# Patient Record
Sex: MALE | Race: WHITE | Employment: OTHER | URBAN - METROPOLITAN AREA
[De-identification: names, ages, dates, MRNs, and addresses within clinical notes are randomized per-mention and may not be internally consistent; named-entity substitution may affect disease eponyms.]

---

## 2018-03-15 ENCOUNTER — TRANSFERRED RECORDS (OUTPATIENT)
Dept: HEALTH INFORMATION MANAGEMENT | Facility: CLINIC | Age: 58
End: 2018-03-15

## 2018-03-19 ENCOUNTER — TRANSFERRED RECORDS (OUTPATIENT)
Dept: HEALTH INFORMATION MANAGEMENT | Facility: CLINIC | Age: 58
End: 2018-03-19

## 2018-04-23 ENCOUNTER — PRE VISIT (OUTPATIENT)
Dept: CARDIOLOGY | Facility: CLINIC | Age: 58
End: 2018-04-23

## 2018-04-24 ENCOUNTER — OFFICE VISIT (OUTPATIENT)
Dept: CARDIOLOGY | Facility: CLINIC | Age: 58
End: 2018-04-24
Payer: COMMERCIAL

## 2018-04-24 VITALS
SYSTOLIC BLOOD PRESSURE: 124 MMHG | WEIGHT: 173.3 LBS | HEART RATE: 60 BPM | DIASTOLIC BLOOD PRESSURE: 70 MMHG | BODY MASS INDEX: 22.97 KG/M2 | HEIGHT: 73 IN

## 2018-04-24 DIAGNOSIS — I35.9 AORTIC VALVULAR DISEASE: ICD-10-CM

## 2018-04-24 DIAGNOSIS — Z86.73 HISTORY OF TIA (TRANSIENT ISCHEMIC ATTACK) AND STROKE: Primary | ICD-10-CM

## 2018-04-24 DIAGNOSIS — I34.0 MYXOMATOUS MITRAL VALVE REGURGITATION: ICD-10-CM

## 2018-04-24 PROCEDURE — 93000 ELECTROCARDIOGRAM COMPLETE: CPT | Performed by: INTERNAL MEDICINE

## 2018-04-24 PROCEDURE — 99205 OFFICE O/P NEW HI 60 MIN: CPT | Performed by: INTERNAL MEDICINE

## 2018-04-24 RX ORDER — CLOPIDOGREL BISULFATE 75 MG/1
75 TABLET ORAL DAILY
COMMUNITY

## 2018-04-24 RX ORDER — ATORVASTATIN CALCIUM 40 MG/1
40 TABLET, FILM COATED ORAL DAILY
COMMUNITY

## 2018-04-24 NOTE — LETTER
4/24/2018      Kwaku Castro MD  [facility] [address]  [place] [address]      RE: Dean Duane Heuer       Dear Colleague,    I had the pleasure of seeing Tony Duane Heuer in the [facility].    Service Date: 04/24/2018      HISTORY OF PRESENT ILLNESS:  The patient is a pleasant 58-year-old gentleman who was seen by Cardiology, [facility] at the [facility] in [place], on 03/15/2018 by Dr. Aspen Neal.  At that time, his assessment was that he had an aneurysmal PFO with a previous history of cryptogenic stroke 6 years previously when he was 52 years of age.  Apparently he was flying to [facility] and while he was landing had severe headache which subsequently led to left-sided weakness, confusion and aphasia.  An MRI there showed a right occipital infarct and 2 small cerebellar infarcts, mostly suggestive of embolic CVA.  The MRI showed no disease of his head and neck vessels.  The etiology was felt secondary to his PFO.  A PK was performed at [facility] showing an aneurysmal atrial septum with moderate shunting with Valsalva.  Back in 2012, they did not suggest closure, but put him on Plavix and Lipitor.  He has done well over the past 6 years.  However, his primary physician felt there may be new evidence and was referred to Cardiology.  Again, after seeing Dr. Neal who recommended closure of his PFO, he sent him to Dr. Robert Loja.  Unfortunately, Dr. Loja was out of his network of care, and therefore, Dr. Loja referred him to me for consideration of closure.      Currently, Mr. Wilson is asymptomatic.  He has had no recurrent TIAs, chest pain, chest pressure, shortness of breath, heart racing, palpitations or edema.  He does not have obstructive sleep apnea.  However, review of his echocardiogram shows a fair amount more than just the PFO where he has moderate left atrial enlargement and myxomatous mitral valve with  moderate mitral regurgitation and 1-2+ aortic insufficiency with aortic sinus dilated at 4.1 cm.  He currently works as a  and he again as stated is on Plavix and Lipitor.      ASSESSMENT AND PLAN:  Overall, the patient is a 58-year-old gentleman with a previous history of cryptogenic stroke in  with a history of aneurysmal PFO and multiple embolic strokes.  I would agree with closure of the defect if there is no evidence of atrial fib which he is at increased risk of because of mitral valve disease and atrial enlargement.  I would recommend that we order a ZIO Patch for 10-14 days to look for asymptomatic atrial fibrillation as well as have him undergo transesophageal echocardiogram to reassess aortic mitral disease as well as assess risk for closure of the presumed PFO, sizing of aortic rim, inferior rim, etc.  This has been explained to Mr. Wilson who wants to proceed.  Risks and benefits of the transesophageal echocardiogram were explained to the patient.  We will have him follow back up with us after the above-mentioned tests are completed.         YOKO WARD MD [facility]             D: 2018   T: 2018   MT: RUDI      Name:     FRAN WILSONN   MRN:      [id number]        Account:      [id number]   :      1960           Service Date: 2018      Document: [id number]         Outpatient Encounter Prescriptions as of 2018   Medication Sig Dispense Refill     atorvastatin (LIPITOR) 40 MG tablet Take 40 mg by mouth daily       clopidogrel (PLAVIX) 75 MG tablet Take 75 mg by mouth daily       No facility-administered encounter medications on file as of 2018.        Again, thank you for allowing me to participate in the care of your patient.      Sincerely,    YOKO WARD MD     [facility]

## 2018-04-24 NOTE — PROGRESS NOTES
"HPI and Plan:   See dictation    Orders Placed This Encounter   Procedures     EKG 12-lead complete w/read - Clinics     Zio Patch Monitor     Transesophageal Echocardiogram     Orders Placed This Encounter   Medications     clopidogrel (PLAVIX) 75 MG tablet     Sig: Take 75 mg by mouth daily     atorvastatin (LIPITOR) 40 MG tablet     Sig: Take 40 mg by mouth daily     There are no discontinued medications.      Encounter Diagnoses   Name Primary?     History of TIA (transient ischemic attack) and stroke Yes     Aortic valvular disease      Myxomatous mitral valve regurgitation        CURRENT MEDICATIONS:  Current Outpatient Prescriptions   Medication Sig Dispense Refill     atorvastatin (LIPITOR) 40 MG tablet Take 40 mg by mouth daily       clopidogrel (PLAVIX) 75 MG tablet Take 75 mg by mouth daily         ALLERGIES   Allergies not on file    PAST MEDICAL HISTORY:  Past Medical History:   Diagnosis Date     PFO (patent foramen ovale)        PAST SURGICAL HISTORY:  No past surgical history on file.    FAMILY HISTORY:  No family history on file.    SOCIAL HISTORY:  Social History     Social History     Marital status:      Spouse name: N/A     Number of children: N/A     Years of education: N/A     Social History Main Topics     Smoking status: Never Smoker     Smokeless tobacco: Never Used     Alcohol use Yes     Drug use: None     Sexual activity: Not Asked     Other Topics Concern     None     Social History Narrative       Review of Systems:  Skin:  Negative     Eyes:  Positive for glasses  ENT:  Negative    Respiratory:  Negative    Cardiovascular:  Negative    Gastroenterology: Negative    Genitourinary:  Negative    Musculoskeletal:  Negative    Neurologic:  Negative    Psychiatric:  Negative    Heme/Lymph/Imm:  Negative    Endocrine:  Negative      Physical Exam:  Vitals: /70  Pulse 60  Ht 1.854 m (6' 1\")  Wt 78.6 kg (173 lb 4.8 oz)  BMI 22.86 kg/m2    Constitutional:  cooperative, alert " and oriented, well developed, well nourished, in no acute distress        Skin:  warm and dry to the touch, no apparent skin lesions or masses noted          Head:  normocephalic, no masses or lesions        Eyes:  pupils equal and round, conjunctivae and lids unremarkable, sclera white, no xanthalasma, EOMS intact, no nystagmus        Lymph:No Cervical lymphadenopathy present     ENT:           Neck:  carotid pulses are full and equal bilaterally, JVP normal, no carotid bruit        Respiratory:  normal breath sounds, clear to auscultation, normal A-P diameter, normal symmetry, normal respiratory excursion, no use of accessory muscles         Cardiac: regular rhythm       late systolic murmur;grade 3        pulses full and equal, no bruits auscultated                                        GI:  abdomen soft, non-tender, BS normoactive, no mass, no HSM, no bruits        Extremities and Muscular Skeletal:  no deformities, clubbing, cyanosis, erythema observed              Neurological:  no gross motor deficits        Psych:  Alert and Oriented x 3        Recent Lab Results:  LIPID RESULTS:  No results found for: CHOL, HDL, LDL, TRIG, CHOLHDLRATIO    LIVER ENZYME RESULTS:  No results found for: AST, ALT    CBC RESULTS:  No results found for: WBC, RBC, HGB, HCT, MCV, MCH, MCHC, RDW, PLT    BMP RESULTS:  No results found for: NA, POTASSIUM, CHLORIDE, CO2, ANIONGAP, GLC, BUN, CR, GFRESTIMATED, GFRESTBLACK, JOSE     A1C RESULTS:  No results found for: A1C    INR RESULTS:  No results found for: INR        CC  Kwaku Castro MD  [address]  [place] [address]

## 2018-04-24 NOTE — LETTER
4/24/2018    Kwaku Castro MD  [facility] [address]  [place] [address]    RE: Dean Duane Heuer       Dear Colleague,    I had the pleasure of seeing Dean Duane Heuer in the [facility].    HPI and Plan:   See dictation    Orders Placed This Encounter   Procedures     EKG 12-lead complete w/read - Clinics     Zio Patch Monitor     Transesophageal Echocardiogram     Orders Placed This Encounter   Medications     clopidogrel (PLAVIX) 75 MG tablet     Sig: Take 75 mg by mouth daily     atorvastatin (LIPITOR) 40 MG tablet     Sig: Take 40 mg by mouth daily     There are no discontinued medications.      Encounter Diagnoses   Name Primary?     History of TIA (transient ischemic attack) and stroke Yes     Aortic valvular disease      Myxomatous mitral valve regurgitation        CURRENT MEDICATIONS:  Current Outpatient Prescriptions   Medication Sig Dispense Refill     atorvastatin (LIPITOR) 40 MG tablet Take 40 mg by mouth daily       clopidogrel (PLAVIX) 75 MG tablet Take 75 mg by mouth daily         ALLERGIES   Allergies not on file    PAST MEDICAL HISTORY:  Past Medical History:   Diagnosis Date     PFO (patent foramen ovale)        PAST SURGICAL HISTORY:  No past surgical history on file.    FAMILY HISTORY:  No family history on file.    SOCIAL HISTORY:  Social History     Social History     Marital status:      Spouse name: N/A     Number of children: N/A     Years of education: N/A     Social History Main Topics     Smoking status: Never Smoker     Smokeless tobacco: Never Used     Alcohol use Yes     Drug use: None     Sexual activity: Not Asked     Other Topics Concern     None     Social History Narrative       Review of Systems:  Skin:  Negative     Eyes:  Positive for glasses  ENT:  Negative    Respiratory:  Negative    Cardiovascular:  Negative    Gastroenterology: Negative    Genitourinary:  Negative    Musculoskeletal:  Negative    Neurologic:  Negative   "  Psychiatric:  Negative    Heme/Lymph/Imm:  Negative    Endocrine:  Negative      Physical Exam:  Vitals: /70  Pulse 60  Ht 1.854 m (6' 1\")  Wt 78.6 kg (173 lb 4.8 oz)  BMI 22.86 kg/m2    Constitutional:  cooperative, alert and oriented, well developed, well nourished, in no acute distress        Skin:  warm and dry to the touch, no apparent skin lesions or masses noted          Head:  normocephalic, no masses or lesions        Eyes:  pupils equal and round, conjunctivae and lids unremarkable, sclera white, no xanthalasma, EOMS intact, no nystagmus        Lymph:No Cervical lymphadenopathy present     ENT:           Neck:  carotid pulses are full and equal bilaterally, JVP normal, no carotid bruit        Respiratory:  normal breath sounds, clear to auscultation, normal A-P diameter, normal symmetry, normal respiratory excursion, no use of accessory muscles         Cardiac: regular rhythm       late systolic murmur;grade 3        pulses full and equal, no bruits auscultated                                        GI:  abdomen soft, non-tender, BS normoactive, no mass, no HSM, no bruits        Extremities and Muscular Skeletal:  no deformities, clubbing, cyanosis, erythema observed              Neurological:  no gross motor deficits        Psych:  Alert and Oriented x 3        Recent Lab Results:  LIPID RESULTS:  No results found for: CHOL, HDL, LDL, TRIG, CHOLHDLRATIO    LIVER ENZYME RESULTS:  No results found for: AST, ALT    CBC RESULTS:  No results found for: WBC, RBC, HGB, HCT, MCV, MCH, MCHC, RDW, PLT    BMP RESULTS:  No results found for: NA, POTASSIUM, CHLORIDE, CO2, ANIONGAP, GLC, BUN, CR, GFRESTIMATED, GFRESTBLACK, JOSE     A1C RESULTS:  No results found for: A1C    INR RESULTS:  No results found for: INR        CC  Kwaku Castro MD  [address] [place] [address]                  Thank you for allowing me to participate in the care of your patient.      Sincerely, "     YOKO WARD MD     [facility]    cc:   Kwaku Castro MD  [address] [place] [address]

## 2018-04-24 NOTE — MR AVS SNAPSHOT
"              After Visit Summary   4/24/2018    Dean Duane Heuer    MRN: [id number]           Patient Information     Date Of Birth          1960        Visit Information        Provider Department      4/24/2018 12:45 PM Mychal Hargrove MD [facility]        Today's Diagnoses     History of TIA (transient ischemic attack) and stroke    -  1    Aortic valvular disease        Myxomatous mitral valve regurgitation           Follow-ups after your visit        Future tests that were ordered for you today     Open Future Orders        Priority Expected Expires Ordered    Transesophageal Echocardiogram Routine 5/1/2018 4/24/2019 4/24/2018    Zio Patch Monitor Routine 5/1/2018 4/24/2019 4/24/2018            Who to contact     If you have questions or need follow up information about today's clinic visit or your schedule please contact [facility] directly at [phone].  Normal or non-critical lab and imaging results will be communicated to you by MyChart, letter or phone within 4 business days after the clinic has received the results. If you do not hear from us within 7 days, please contact the clinic through [employer] or phone. If you have a critical or abnormal lab result, we will notify you by phone as soon as possible.  Submit refill requests through [employer] or call your pharmacy and they will forward the refill request to us. Please allow 3 business days for your refill to be completed.          Additional Information About Your Visit        MyChart Information     [employer] lets you send messages to your doctor, view your test results, renew your prescriptions, schedule appointments and more. To sign up, go to [email or web address] . Click on \"Log in\" on the left side of the screen, which will take you to the Welcome page. Then click on \"Sign up Now\" on the right side of the page.     You will be asked to enter the access code " "listed below, as well as some personal information. Please follow the directions to create your username and password.     Your access code is: [email or web address]  Expires: 2018  1:26 PM     Your access code will  in 90 days. If you need help or a new code, please call your [facility] or [phone].        Care EveryWhere ID     This is your Care EveryWhere ID. This could be used by other organizations to access your [facility] medical records  [id number]        Your Vitals Were     Pulse Height BMI (Body Mass Index)             60 1.854 m (6' 1\") 22.86 kg/m2          Blood Pressure from Last 3 Encounters:   18 124/70    Weight from Last 3 Encounters:   18 78.6 kg (173 lb 4.8 oz)              We Performed the Following     EKG 12-lead complete w/read - Clinics        Primary Care Provider Office Phone # Fax #    Kwaku Castro [phone]       [address] [address]        Equal Access to Services     [facility]: Hadii aad ku hadasho Soomaali, waaxda luqadaha, qaybta kaalmada adeegyada, jovi vargas . So [facility] [phone].    ATENCIÓN: Si habla español, tiene a damon disposición servicios gratuitos de asistencia lingüística. Ken al [phone].    We comply with applicable federal civil rights laws and [place] laws. We do not discriminate on the basis of race, color, national origin, age, disability, sex, sexual orientation, or gender identity.            Thank you!     Thank you for choosing [facility]  for your care. Our goal is always to provide you with excellent care. Hearing back from our patients is one way we can continue to improve our services. Please take a few minutes to complete the written survey that you may receive in the mail after your visit with us. Thank you!             Your Updated Medication List - Protect others around you: Learn how to safely use, " store and throw away your medicines at www.disposemymeds.org.          This list is accurate as of 4/24/18  1:26 PM.  Always use your most recent med list.                   Brand Name Dispense Instructions for use Diagnosis    LIPITOR 40 MG tablet   Generic drug:  atorvastatin      Take 40 mg by mouth daily        PLAVIX 75 MG tablet   Generic drug:  clopidogrel      Take 75 mg by mouth daily

## 2018-04-24 NOTE — PROGRESS NOTES
Service Date: 04/24/2018      HISTORY OF PRESENT ILLNESS:  The patient is a pleasant 58-year-old gentleman who was seen by Cardiology, [facility] at the [facility] in [place], on 03/15/2018 by Dr. Aspen Neal.  At that time, his assessment was that he had an aneurysmal PFO with a previous history of cryptogenic stroke 6 years previously when he was 52 years of age.  Apparently he was flying to [facility] and while he was landing had severe headache which subsequently led to left-sided weakness, confusion and aphasia.  An MRI there showed a right occipital infarct and 2 small cerebellar infarcts, mostly suggestive of embolic CVA.  The MRI showed no disease of his head and neck vessels.  The etiology was felt secondary to his PFO.  A PK was performed at [facility] showing an aneurysmal atrial septum with moderate shunting with Valsalva.  Back in 2012, they did not suggest closure, but put him on Plavix and Lipitor.  He has done well over the past 6 years.  However, his primary physician felt there may be new evidence and was referred to Cardiology.  Again, after seeing Dr. Neal who recommended closure of his PFO, he sent him to Dr. Robert Loja.  Unfortunately, Dr. Loaj was out of his network of care, and therefore, Dr. Loja referred him to me for consideration of closure.      Currently, Mr. Wilson is asymptomatic.  He has had no recurrent TIAs, chest pain, chest pressure, shortness of breath, heart racing, palpitations or edema.  He does not have obstructive sleep apnea.  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  He currently works as a  and he again as stated is on Plavix and Lipitor.      ASSESSMENT AND PLAN:  Overall, the patient is a  58-year-old gentleman with a previous history of cryptogenic stroke in 2012 with a history of aneurysmal PFO and multiple embolic strokes.  I would agree with closure of the defect if there is no evidence of atrial fib which he is at increased risk of because of mitral valve disease and atrial enlargement.  I would recommend that we order a ZIO Patch for 10-14 days to look for asymptomatic atrial fibrillation as well as have him undergo transesophageal echocardiogram to reassess aortic mitral disease as well as assess risk for closure of the presumed PFO, sizing of aortic rim, inferior rim, etc.  This has been explained to Mr. Wilson who wants to proceed.  Risks and benefits of the transesophageal echocardiogram were explained to the patient.  We will have him follow back up with us after the above-mentioned tests are completed.         YOKO WARD MD [facility]             D: 2018   T: 2018   MT: RUDI      Name:     KAL WILSON   MRN:      [id number]        Account:      [id number]   :      1960           Service Date: 2018      Document: [id number]

## 2018-05-09 ENCOUNTER — HOSPITAL ENCOUNTER (OUTPATIENT)
Dept: CARDIOLOGY | Facility: CLINIC | Age: 58
End: 2018-05-09
Attending: INTERNAL MEDICINE | Admitting: INTERNAL MEDICINE
Payer: COMMERCIAL

## 2018-05-09 ENCOUNTER — HOSPITAL ENCOUNTER (OUTPATIENT)
Facility: CLINIC | Age: 58
Discharge: HOME | End: 2018-05-09
Attending: INTERNAL MEDICINE | Admitting: INTERNAL MEDICINE
Payer: COMMERCIAL

## 2018-05-09 VITALS
BODY MASS INDEX: 23.03 KG/M2 | RESPIRATION RATE: 16 BRPM | WEIGHT: 170 LBS | TEMPERATURE: 97.6 F | SYSTOLIC BLOOD PRESSURE: 121 MMHG | HEIGHT: 72 IN | DIASTOLIC BLOOD PRESSURE: 74 MMHG | HEART RATE: 58 BPM | OXYGEN SATURATION: 97 %

## 2018-05-09 DIAGNOSIS — I34.0 MYXOMATOUS MITRAL VALVE REGURGITATION: ICD-10-CM

## 2018-05-09 DIAGNOSIS — I35.9 AORTIC VALVULAR DISEASE: ICD-10-CM

## 2018-05-09 DIAGNOSIS — Z86.73 HISTORY OF TIA (TRANSIENT ISCHEMIC ATTACK) AND STROKE: ICD-10-CM

## 2018-05-09 PROCEDURE — 93312 ECHO TRANSESOPHAGEAL: CPT | Mod: 26 | Performed by: INTERNAL MEDICINE

## 2018-05-09 PROCEDURE — 0296T ZIO PATCH HOLTER: CPT

## 2018-05-09 PROCEDURE — 25000125 ZZHC RX 250

## 2018-05-09 PROCEDURE — 40000235 ZZH STATISTIC TELEMETRY

## 2018-05-09 PROCEDURE — 93320 DOPPLER ECHO COMPLETE: CPT | Mod: 26 | Performed by: INTERNAL MEDICINE

## 2018-05-09 PROCEDURE — 25000125 ZZHC RX 250: Performed by: INTERNAL MEDICINE

## 2018-05-09 PROCEDURE — 0298T ZZC EXT ECG > 48HR TO 21 DAY REVIEW AND INTERPRETATN: CPT | Performed by: INTERNAL MEDICINE

## 2018-05-09 PROCEDURE — 93325 DOPPLER ECHO COLOR FLOW MAPG: CPT | Mod: 26 | Performed by: INTERNAL MEDICINE

## 2018-05-09 PROCEDURE — 93312 ECHO TRANSESOPHAGEAL: CPT

## 2018-05-09 PROCEDURE — 25000128 H RX IP 250 OP 636: Performed by: INTERNAL MEDICINE

## 2018-05-09 PROCEDURE — 40000857 ZZH STATISTIC TEE INCLUDES SEDATION

## 2018-05-09 RX ORDER — GLYCOPYRROLATE 0.2 MG/ML
0.1 INJECTION, SOLUTION INTRAMUSCULAR; INTRAVENOUS ONCE
Status: DISCONTINUED | OUTPATIENT
Start: 2018-05-09 | End: 2018-05-10 | Stop reason: HOSPADM

## 2018-05-09 RX ORDER — LIDOCAINE 50 MG/G
0.5 OINTMENT TOPICAL ONCE
Status: COMPLETED | OUTPATIENT
Start: 2018-05-09 | End: 2018-05-09

## 2018-05-09 RX ORDER — LIDOCAINE 40 MG/G
CREAM TOPICAL
Status: DISCONTINUED | OUTPATIENT
Start: 2018-05-09 | End: 2018-05-09 | Stop reason: HOSPADM

## 2018-05-09 RX ORDER — FENTANYL CITRATE 50 UG/ML
25-50 INJECTION, SOLUTION INTRAMUSCULAR; INTRAVENOUS
Status: DISCONTINUED | OUTPATIENT
Start: 2018-05-09 | End: 2018-05-09 | Stop reason: HOSPADM

## 2018-05-09 RX ORDER — FENTANYL CITRATE 50 UG/ML
25-50 INJECTION, SOLUTION INTRAMUSCULAR; INTRAVENOUS
Status: DISCONTINUED | OUTPATIENT
Start: 2018-05-09 | End: 2018-05-10 | Stop reason: HOSPADM

## 2018-05-09 RX ORDER — GLYCOPYRROLATE 0.2 MG/ML
0.1 INJECTION, SOLUTION INTRAMUSCULAR; INTRAVENOUS ONCE
Status: COMPLETED | OUTPATIENT
Start: 2018-05-09 | End: 2018-05-09

## 2018-05-09 RX ORDER — NALOXONE HYDROCHLORIDE 0.4 MG/ML
.1-.4 INJECTION, SOLUTION INTRAMUSCULAR; INTRAVENOUS; SUBCUTANEOUS
Status: DISCONTINUED | OUTPATIENT
Start: 2018-05-09 | End: 2018-05-09 | Stop reason: HOSPADM

## 2018-05-09 RX ORDER — NALOXONE HYDROCHLORIDE 0.4 MG/ML
.1-.4 INJECTION, SOLUTION INTRAMUSCULAR; INTRAVENOUS; SUBCUTANEOUS
Status: DISCONTINUED | OUTPATIENT
Start: 2018-05-09 | End: 2018-05-10 | Stop reason: HOSPADM

## 2018-05-09 RX ORDER — FENTANYL CITRATE 50 UG/ML
50-100 INJECTION, SOLUTION INTRAMUSCULAR; INTRAVENOUS
Status: DISCONTINUED | OUTPATIENT
Start: 2018-05-09 | End: 2018-05-10 | Stop reason: HOSPADM

## 2018-05-09 RX ORDER — LIDOCAINE 40 MG/G
CREAM TOPICAL
Status: DISCONTINUED | OUTPATIENT
Start: 2018-05-09 | End: 2018-05-10 | Stop reason: HOSPADM

## 2018-05-09 RX ORDER — FLUMAZENIL 0.1 MG/ML
0.2 INJECTION, SOLUTION INTRAVENOUS
Status: DISCONTINUED | OUTPATIENT
Start: 2018-05-09 | End: 2018-05-09 | Stop reason: HOSPADM

## 2018-05-09 RX ORDER — FENTANYL CITRATE 50 UG/ML
50-100 INJECTION, SOLUTION INTRAMUSCULAR; INTRAVENOUS
Status: COMPLETED | OUTPATIENT
Start: 2018-05-09 | End: 2018-05-09

## 2018-05-09 RX ORDER — FLUMAZENIL 0.1 MG/ML
0.2 INJECTION, SOLUTION INTRAVENOUS
Status: DISCONTINUED | OUTPATIENT
Start: 2018-05-09 | End: 2018-05-10 | Stop reason: HOSPADM

## 2018-05-09 RX ORDER — SODIUM CHLORIDE 9 MG/ML
INJECTION, SOLUTION INTRAVENOUS CONTINUOUS PRN
Status: DISCONTINUED | OUTPATIENT
Start: 2018-05-09 | End: 2018-05-10 | Stop reason: HOSPADM

## 2018-05-09 RX ORDER — SODIUM CHLORIDE 9 MG/ML
INJECTION, SOLUTION INTRAVENOUS CONTINUOUS PRN
Status: DISCONTINUED | OUTPATIENT
Start: 2018-05-09 | End: 2018-05-09 | Stop reason: HOSPADM

## 2018-05-09 RX ADMIN — FENTANYL CITRATE 25 MCG: 50 INJECTION, SOLUTION INTRAMUSCULAR; INTRAVENOUS at 13:32

## 2018-05-09 RX ADMIN — GLYCOPYRROLATE 0.1 MG: 0.2 INJECTION, SOLUTION INTRAMUSCULAR; INTRAVENOUS at 12:43

## 2018-05-09 RX ADMIN — MIDAZOLAM HYDROCHLORIDE 1 MG: 1 INJECTION, SOLUTION INTRAMUSCULAR; INTRAVENOUS at 13:28

## 2018-05-09 RX ADMIN — LIDOCAINE 0.5 G: 50 OINTMENT TOPICAL at 12:50

## 2018-05-09 RX ADMIN — MIDAZOLAM HYDROCHLORIDE 1 MG: 1 INJECTION, SOLUTION INTRAMUSCULAR; INTRAVENOUS at 13:25

## 2018-05-09 RX ADMIN — SODIUM CHLORIDE: 9 INJECTION, SOLUTION INTRAVENOUS at 12:19

## 2018-05-09 RX ADMIN — FENTANYL CITRATE 50 MCG: 50 INJECTION, SOLUTION INTRAMUSCULAR; INTRAVENOUS at 13:29

## 2018-05-09 RX ADMIN — FENTANYL CITRATE 50 MCG: 50 INJECTION, SOLUTION INTRAMUSCULAR; INTRAVENOUS at 13:26

## 2018-05-09 NOTE — IP AVS SNAPSHOT
MRN:[id number]                      After Visit Summary   5/9/2018    Tony Duane Heuer    MRN: [id number]           Visit Information        Department      5/9/2018 11:36 AM [facility]          Review of your medicines      UNREVIEWED medicines. Ask your doctor about these medicines        Dose / Directions    LIPITOR 40 MG tablet   Generic drug:  atorvastatin        Dose:  40 mg   Take 40 mg by mouth daily   Refills:  0       PLAVIX 75 MG tablet   Generic drug:  clopidogrel        Dose:  75 mg   Take 75 mg by mouth daily   Refills:  0                Protect others around you: Learn how to safely use, store and throw away your medicines at www.disposemymeds.org.         Follow-ups after your visit        Your next 10 appointments already scheduled     May 09, 2018  1:30 PM CDT   Ech Uday with SHECHR2   [facility] ([facility])    [address] [place] [address]   [phone]           1.  Please bring or wear a comfortable two-piece outfit. 2.  Arrival time: -   [facility]:  arrive 75 minutes prior to examination time. -   [facility]:  arrive 90 minutes prior to examination time. -   [facility]:   arrive 15 minutes prior to examination time. 3.  Plan to have someone here to drive you home after the test. -   Someone should stay with you for 6 hours after your test. 4.  No food or drink: -   6 hours before the test 5.  If you take antacids or water pills (diuretics): Do not take them until after your test. You may take blood pressure medicine with a few sips of water. 6.  If you have diabetes: -   Morning slots preferred -   If you take insulin, call your diabetes care team. Ask if you should take a   dose the morning of your test. -   If you take diabetes medicine by mouth, don't take it on the morning of your test. Bring it with you to take after the test. (If you have questions, call your diabetes care team.) 7.  Bring a list of  any medicines you are taking. 8.  Do not drive for 24 hours after the test. 9.   A responsible adult must stay with you for 24 hours after the test.  10.  For any questions that cannot be answered, please contact the ordering physician            May 09, 2018  4:00 PM CDT   ZIOPATCH MONITOR with NENA   [facility] Radiology - [facility] ([facility])    [address]  [place] [address] [phone]               Care Instructions        Further instructions from your care team       PK  (Transesophageal Echocardiogram)  Discharge Instructions    After you go home:      Have an adult stay with you for 6 hours.       For 24 hours - due to the sedation you received:    Relax and take it easy.    Do NOT make any important or legal decisions.    Do NOT drive or operate machines at home or at work.    Do NOT drink alcohol.    Diet:    You may resume your normal diet, but no scratchy foods for two days.    If your throat is sore, eat cold, bland or soft foods.    You may have heartburn if the tube used in the exam entered your stomach.  If so:   - Do not eat acidic and spicy foods.   - Do not eat three hours before bedtime. Clear liquids are okay.   - When lying down, use two pillows to raise your head.    Medicines:      Take your medications, including blood thinners, unless your provider tells you not to.    If you have stopped any medicines, check with your provider about when to restart them.    You may take Tylenol (Acetaminophen) if your throat is sore.    You may take antacids if you have heartburn.      Follow Up Appointments:      Follow up with your cardiologist at [facility] of patient preference as instructed.    Follow up with your primary care provider as needed.    Call the clinic if:      You have heartburn that is severe or lasts more than 72 hours.    You have a sore throat that feels worse after 72 hours.    You have shortness of breath, neck pain,  "chest pain, fever, chills, coughing up blood, or other unusual signs.    Questions or concerns      [facility] at [place]:    [phone] [facility] (7 days a week)         Additional Information About Your Visit        MyChart Information     [facility] lets you send messages to your doctor, view your test results, renew your prescriptions, schedule appointments and more. To sign up, go to [email or web address] . Click on \"Log in\" on the left side of the screen, which will take you to the Welcome page. Then click on \"Sign up Now\" on the right side of the page.     You will be asked to enter the access code listed below, as well as some personal information. Please follow the directions to create your username and password.     Your access code is: [email or web address]  Expires: 2018  1:26 PM     Your access code will  in 90 days. If you need help or a new code, please call your [place] clinic or [phone].        Care EveryWhere ID     This is your Care EveryWhere ID. This could be used by other organizations to access your [place] medical records  [id number]        Your Vitals Were     Blood Pressure Pulse Temperature Respirations Height Weight    129/70 (BP Location: Left arm) 62 97.6  F (36.4  C) (Oral) 16 1.829 m (6') 77.1 kg (170 lb)    Pulse Oximetry BMI (Body Mass Index)                99% 23.06 kg/m2           Primary Care Provider Office Phone # Fax #    Kwaku Castro [phone]      Equal Access to Services     [facility] : Hadii eri david hadasho Soglynnali, waaxda luqadaha, qaybta kaalmada adeegyalaura, jovi man. So [facility] [phone].    ATENCIÓN: Si habla español, tiene a damon disposición servicios gratuitos de asistencia lingüística. Llame al [phone].    We comply with applicable federal civil rights laws and [place] laws. We do not discriminate on the basis of race, color, national origin, age, disability, sex, " sexual orientation, or gender identity.            Thank you!     Thank you for choosing [facility] for your care. Our goal is always to provide you with excellent care. Hearing back from our patients is one way we can continue to improve our services. Please take a few minutes to complete the written survey that you may receive in the mail after you visit with us. Thank you!             Medication List: This is a list of all your medications and when to take them. Check marks below indicate your daily home schedule. Keep this list as a reference.      Medications           Morning Afternoon Evening Bedtime As Needed    LIPITOR 40 MG tablet   Take 40 mg by mouth daily   Generic drug:  atorvastatin                                PLAVIX 75 MG tablet   Take 75 mg by mouth daily   Generic drug:  clopidogrel

## 2018-05-09 NOTE — IP AVS SNAPSHOT
[place] [address]    [place] [address]    Phone:  [phone]                                       After Visit Summary   5/9/2018    Tony Duane Heuer    MRN: [id number]           After Visit Summary Signature Page     I have received my discharge instructions, and my questions have been answered. I have discussed any challenges I see with this plan with the nurse or doctor.    ..........................................................................................................................................  Patient/Patient Representative Signature      ..........................................................................................................................................  Patient Representative Print Name and Relationship to Patient    ..................................................               ................................................  Date                                            Time    ..........................................................................................................................................  Reviewed by Signature/Title    ...................................................              ..............................................  Date                                                            Time

## 2018-05-09 NOTE — PROGRESS NOTES
PK completed per Dr. Hoff. Pt drowsy but awakens easily and denies any c/o and states tolerated PK well. AVS was reviewed and given to pt and wife pre PK. Will observe.

## 2018-05-09 NOTE — PROGRESS NOTES
Tolerating ice cream, crackers, juice well. Wife here with pt. Denies any c/o. Ready for discharge and will get Zio patch before leaving.

## 2018-05-09 NOTE — DISCHARGE INSTRUCTIONS
PK  (Transesophageal Echocardiogram)  Discharge Instructions    After you go home:      Have an adult stay with you for 6 hours.       For 24 hours - due to the sedation you received:    Relax and take it easy.    Do NOT make any important or legal decisions.    Do NOT drive or operate machines at home or at work.    Do NOT drink alcohol.    Diet:    You may resume your normal diet, but no scratchy foods for two days.    If your throat is sore, eat cold, bland or soft foods.    You may have heartburn if the tube used in the exam entered your stomach.  If so:   - Do not eat acidic and spicy foods.   - Do not eat three hours before bedtime. Clear liquids are okay.   - When lying down, use two pillows to raise your head.    Medicines:      Take your medications, including blood thinners, unless your provider tells you not to.    If you have stopped any medicines, check with your provider about when to restart them.    You may take Tylenol (Acetaminophen) if your throat is sore.    You may take antacids if you have heartburn.      Follow Up Appointments:      Follow up with your cardiologist at [facility] of patient preference as instructed.    Follow up with your primary care provider as needed.    Call the clinic if:      You have heartburn that is severe or lasts more than 72 hours.    You have a sore throat that feels worse after 72 hours.    You have shortness of breath, neck pain, chest pain, fever, chills, coughing up blood, or other unusual signs.    Questions or concerns      [facility] at [place]:    [phone] [facility] (7 days a week)

## 2018-05-31 ENCOUNTER — TELEPHONE (OUTPATIENT)
Dept: CARDIOLOGY | Facility: CLINIC | Age: 58
End: 2018-05-31

## 2018-05-31 DIAGNOSIS — Z86.73 HISTORY OF CVA (CEREBROVASCULAR ACCIDENT): Primary | ICD-10-CM

## 2018-05-31 NOTE — TELEPHONE ENCOUNTER
Patient calling to get recent test results.. Reviewed PK echo result and zio patch results with the patient. The patient understood the results and seeking what Dr. Hargrove would like to do with his leaky valves and PFO..  I informed the patient that I would review with Dr. Hargrove and update him early next week..     Dr. Hargrove has reviewed PK - bubble study is negative therefore no closure of PFO is recommended at this time.  Zio Patch showing 12 SVT episodes longest one lasting 9 beats.  Dr. Hargrove would like patient to see EP to discuss implant of REVEAL monitor to determine if any atrial fib/flutter.  I have called patient and explained all this to him.  Will schedule him to see EP  NP/PA to go over risks and benefits prior to REVEAL monitor.

## 2018-06-14 ENCOUNTER — OFFICE VISIT (OUTPATIENT)
Dept: CARDIOLOGY | Facility: CLINIC | Age: 58
End: 2018-06-14
Attending: INTERNAL MEDICINE
Payer: COMMERCIAL

## 2018-06-14 VITALS
DIASTOLIC BLOOD PRESSURE: 60 MMHG | BODY MASS INDEX: 22.49 KG/M2 | WEIGHT: 169.7 LBS | HEIGHT: 73 IN | HEART RATE: 60 BPM | SYSTOLIC BLOOD PRESSURE: 122 MMHG

## 2018-06-14 DIAGNOSIS — Z86.73 HISTORY OF CVA (CEREBROVASCULAR ACCIDENT): ICD-10-CM

## 2018-06-14 PROCEDURE — 99214 OFFICE O/P EST MOD 30 MIN: CPT | Performed by: NURSE PRACTITIONER

## 2018-06-14 NOTE — LETTER
6/14/2018    Kwaku Castro MD  [address]  [place] [address]    RE: Dean Duane Heuer       Dear Colleague,    I had the pleasure of seeing Dean Duane Heuer in the [facility].    HPI:  Dean Duane Heuer is a 58 year old male who is a patient of Dr. Hargrove.  He is a  in [facility].  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  An MRI there showed a right occipital infarct and 2 small cerebellar infarcts, mostly suggestive of embolic CVA.  The MRI showed no disease of his head and neck vessels.  The etiology was felt secondary to his PFO.  A PK was performed at [facility] showing an aneurysmal atrial septum with moderate shunting with Valsalva.  Back in 2012, they did not suggest closure, but put him on Plavix and Lipitor.   Dr. Loja was out of his network of care, therefore was referred to Dr. Hargrove.    He recently had a zio patch (5/9/2018) which showed no atrial fibrillation.  PK (5/9/2018)  Revealed EF 65%,  Bileaflet mitral valve prolapse with Mild to moderate mitral regurgitation, moderate, eccentric aortic regurgitation,  fossa ovalis portion of the intra-atrial septum appears aneurysmal and no evidence of inter-atrial shunt. Negative bubble study.    After the zio patch and now negative bubble study, Dr. Hargrove recommends a loop recorder be placed looking for atrial fibrillation.  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   Denies chest pain or pressure, headaches, dizziness, syncope, angina, dyspnea at rest or with exertion, dry cough, palpitations, orthopnea, PND, abdominal pain, abdominal edema, pedal edema, or claudication.  Denies easy bruising or bleeding,  hematuria, hematochezia, and epistaxis. Denies signs/symptoms of stroke such as visual disturbance, difficulty speaking, facial drooping, confusion, problems with gait, or any new numbness or weakness.  Drinks alcohol socially and denies tobacco use.  Denies sleep apnea symptoms.      ASSESSMENT AND PLAN    (Z86.73) History of CVA (cerebrovascular accident)   Had a cryptogenic CVA in 2012.  Recent negative bubble test and negative zio patch. Recommend placement of a loop recorder looking for paroxsymal atrial fibrillation.   Risks and benefits of the procedure were reviewed including but not limited to pain, infection, bleeding, reaction to the sedation. Alternatives were discussed including doing nothing. All questions were answered to the patient's satisfaction. Informed consent was obtained and patient wished to proceed.     Will have patient follow up with Dr. Hargrove in 4-6 months.       Patient expresses understanding and agreement with the plan.     I appreciate the chance to help with Dean Duane Heuer Please let me know if you have any questions or concerns.    Nicole Guillen APRN, CNP    This note was completed in part using Dragon voice recognition software. Although reviewed after completion, some word and grammatical errors may occur.    Orders Placed This Encounter   Procedures     Follow-Up with Cardiologist     No orders of the defined types were placed in this encounter.    There are no discontinued medications.      Encounter Diagnosis   Name Primary?     History of CVA (cerebrovascular accident)        CURRENT MEDICATIONS:  Current Outpatient Prescriptions   Medication Sig Dispense Refill     atorvastatin (LIPITOR) 40 MG tablet Take 40 mg by mouth daily       clopidogrel (PLAVIX) 75 MG tablet Take 75 mg by mouth daily         ALLERGIES   No Known Allergies    PAST MEDICAL HISTORY:  Past Medical History:   Diagnosis Date     Cerebral infarction (H)      PFO (patent foramen ovale)        PAST  "SURGICAL HISTORY:  Past Surgical History:   Procedure Laterality Date     HERNIA REPAIR         FAMILY HISTORY:  Family History   Problem Relation Age of Onset     DIABETES Father      DM Type II       SOCIAL HISTORY:  Social History     Social History     Marital status:      Spouse name: N/A     Number of children: N/A     Years of education: N/A     Social History Main Topics     Smoking status: Never Smoker     Smokeless tobacco: Never Used     Alcohol use Yes      Comment: 2-3 drinks/week     Drug use: None     Sexual activity: Not Asked     Other Topics Concern     None     Social History Narrative       Review of Systems:  Skin:  Negative     Eyes:  Positive for glasses  ENT:  Negative    Respiratory:  Negative    Cardiovascular:  Negative    Gastroenterology: Negative    Genitourinary:  Negative    Musculoskeletal:  Negative    Neurologic:  Positive for stroke  Psychiatric:  Negative    Heme/Lymph/Imm:  Negative    Endocrine:  Negative      Physical Exam:  Vitals: /60  Pulse 60  Ht 1.854 m (6' 1\")  Wt 77 kg (169 lb 11.2 oz)  BMI 22.39 kg/m2    Constitutional:  cooperative, alert and oriented, well developed, well nourished, in no acute distress        Skin:  warm and dry to the touch, no apparent skin lesions or masses noted        Head:  normocephalic, no masses or lesions        Eyes:  pupils equal and round, conjunctivae and lids unremarkable, sclera white, no xanthalasma, EOMS intact, no nystagmus        ENT:           Neck:  carotid pulses are full and equal bilaterally        Chest:  normal breath sounds, clear to auscultation, normal A-P diameter, normal symmetry, normal respiratory excursion, no use of accessory muscles        Cardiac: regular rhythm, normal S1/S2, no S3 or S4, apical impulse not displaced, no murmurs, gallops or rubs                  Abdomen:  abdomen soft        Vascular: pulses full and equal, no bruits auscultated     right radial artery;2+             left " radial artery;2+                  Extremities and Back:  no deformities, clubbing, cyanosis, erythema observed        Neurological:  no gross motor deficits   blindness on upper visual fields      Recent Lab Results:  LIPID RESULTS:  No results found for: CHOL, HDL, LDL, TRIG, CHOLHDLRATIO    LIVER ENZYME RESULTS:  No results found for: AST, ALT    CBC RESULTS:  No results found for: WBC, RBC, HGB, HCT, MCV, MCH, MCHC, RDW, PLT    BMP RESULTS:  No results found for: NA, POTASSIUM, CHLORIDE, CO2, ANIONGAP, GLC, BUN, CR, GFRESTIMATED, GFRESTBLACK, JOSE     A1C RESULTS:  No results found for: A1C    INR RESULTS:  No results found for: INR        CC  Mychal Hargrove MD  [address]  [place] [phone]                  Thank you for allowing me to participate in the care of your patient.      Sincerely,     DANILO [facility]    cc:   Mychal Hargrove MD  [address]  [place] [phone]

## 2018-06-14 NOTE — MR AVS SNAPSHOT
After Visit Summary   6/14/2018    Dean Duane Heuer    MRN: [id number]           Patient Information     Date Of Birth          1960        Visit Information        Provider Department      6/14/2018 1:10 PM Nicole Guillen APRN [facility]   [place]        Today's Diagnoses     History of CVA (cerebrovascular accident)          Care Instructions      If you need a medication refill please contact your pharmacy. Please allow 3 business days for your refill to be completed.     As always, thank you for trusting us with your health care needs!    If you have any questions regarding your visit please contact your care team:   Cardiology  Telephone Number    Afib RNs  Darshan, Avelina, and [place] [phone]     Call for Electrophysiology procedure scheduling concerns  Jenn Xie [phone]   Device Clinic (Pacemakers, ICDs, Loop)   RN's :    Shakira Boyd Lynda, MJ, Stephanie, Sue During business hours:   [phone]                 Follow-ups after your visit        Additional Services     Follow-Up with Cardiologist                 Your next 10 appointments already scheduled     Jun 26, 2018  1:00 PM CDT   Ep 90 Minute with SHCVR4   [facility] Cardiac Catheterization Lab ([facility])    [address]  [place] [id number] [phone]              Future tests that were ordered for you today     Open Future Orders        Priority Expected Expires Ordered    Follow-Up with Cardiologist Routine 10/12/2018 6/14/2019 6/14/2018            Who to contact     If you have questions or need follow up information about today's clinic visit or your schedule please contact [facility]   [place] directly at [phone].  Normal or non-critical lab and imaging results will be communicated to you by MyChart, letter or phone within 4 business days after the clinic has received the results. If you do not hear from us  "within 7 days, please contact the clinic through Gist[facility] or phone. If you have a critical or abnormal lab result, we will notify you by phone as soon as possible.  Submit refill requests through [employer] or call your pharmacy and they will forward the refill request to us. Please allow 3 business days for your refill to be completed.          Additional Information About Your Visit        Care EveryWhere ID     This is your Care EveryWhere ID. This could be used by other organizations to access your [place] medical records  [id number]        Your Vitals Were     Pulse Height BMI (Body Mass Index)             60 1.854 m (6' 1\") 22.39 kg/m2          Blood Pressure from Last 3 Encounters:   06/14/18 122/60   05/09/18 121/74   04/24/18 124/70    Weight from Last 3 Encounters:   06/14/18 77 kg (169 lb 11.2 oz)   05/09/18 77.1 kg (170 lb)   04/24/18 78.6 kg (173 lb 4.8 oz)              We Performed the Following     Follow-Up with Cardiac Advanced Practice Provider        Primary Care Provider Office Phone # Fax #    Kwaku Castro [phone]       [address] [address]        Equal Access to Services     MONICA SALEH : Hadii aad ku hadasho Soomaali, waaxda luqadaha, qaybta kaalmada adeegyada, waxay idiin hayrishin zack man. So [facility] [phone].    ATENCIÓN: Si habla español, tiene a damon disposición servicios gratuitos de asistencia lingüística. Llame al [phone].    We comply with applicable federal civil rights laws and [place] laws. We do not discriminate on the basis of race, color, national origin, age, disability, sex, sexual orientation, or gender identity.            Thank you!     Thank you for choosing [facility]  for your care. Our goal is always to provide you with excellent care. Hearing back from our patients is one way we can continue to improve our services. Please take a few minutes to complete the " written survey that you may receive in the mail after your visit with us. Thank you!             Your Updated Medication List - Protect others around you: Learn how to safely use, store and throw away your medicines at www.disposemymeds.org.          This list is accurate as of 6/14/18  1:36 PM.  Always use your most recent med list.                   Brand Name Dispense Instructions for use Diagnosis    LIPITOR 40 MG tablet   Generic drug:  atorvastatin      Take 40 mg by mouth daily        PLAVIX 75 MG tablet   Generic drug:  clopidogrel      Take 75 mg by mouth daily

## 2018-06-14 NOTE — PATIENT INSTRUCTIONS
If you need a medication refill please contact your pharmacy. Please allow 3 business days for your refill to be completed.     As always, thank you for trusting us with your health care needs!    If you have any questions regarding your visit please contact your care team:   Cardiology  Telephone Number    Afib RNs  Avelina Sexton, and Gege [phone]     Call for Electrophysiology procedure scheduling concerns  Jenn Xie [phone]   Device Clinic (Pacemakers, ICDs, Loop)   RN's :    Shakira Boyd Lynda, MJ, Jihan Newton During business hours:   [phone]

## 2018-06-14 NOTE — PROGRESS NOTES
HPI:  Dean Duane Heuer is a 58 year old male who is a patient of Dr. Hargrove.  He is a  in [facility].  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  An MRI there showed a right occipital infarct and 2 small cerebellar infarcts, mostly suggestive of embolic CVA.  The MRI showed no disease of his head and neck vessels.  The etiology was felt secondary to his PFO.  A PK was performed at [facility] showing an aneurysmal atrial septum with moderate shunting with Valsalva.  Back in 2012, they did not suggest closure, but put him on Plavix and Lipitor.   Dr. Loja was out of his network of care, therefore was referred to Dr. Hargrove.    He recently had a zio patch (5/9/2018) which showed no atrial fibrillation.  PK (5/9/2018)  Revealed EF 65%,  Bileaflet mitral valve prolapse with Mild to moderate mitral regurgitation, moderate, eccentric aortic regurgitation,  fossa ovalis portion of the intra-atrial septum appears aneurysmal and no evidence of inter-atrial shunt. Negative bubble study.    After the zio patch and now negative bubble study, Dr. Hargrove recommends a loop recorder be placed looking for atrial fibrillation.  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   Denies chest pain or pressure, headaches, dizziness, syncope, angina, dyspnea at rest or with exertion, dry cough, palpitations, orthopnea, PND, abdominal pain, abdominal edema, pedal edema, or claudication.  Denies easy bruising or bleeding, hematuria, hematochezia, and epistaxis. Denies signs/symptoms of stroke such as visual disturbance, difficulty speaking, facial drooping, confusion, problems with gait, or any new numbness or weakness.  Drinks alcohol socially and denies  tobacco use.  Denies sleep apnea symptoms.      ASSESSMENT AND PLAN    (Z86.73) History of CVA (cerebrovascular accident)   Had a cryptogenic CVA in 2012.  Recent negative bubble test and negative zio patch. Recommend placement of a loop recorder looking for paroxsymal atrial fibrillation.   Risks and benefits of the procedure were reviewed including but not limited to pain, infection, bleeding, reaction to the sedation. Alternatives were discussed including doing nothing. All questions were answered to the patient's satisfaction. Informed consent was obtained and patient wished to proceed.     Will have patient follow up with Dr. Hargrove in 4-6 months.       Patient expresses understanding and agreement with the plan.     I appreciate the chance to help with Dean Duane Heuer Please let me know if you have any questions or concerns.    Nicole Guillen APRN, CNP    This note was completed in part using Dragon voice recognition software. Although reviewed after completion, some word and grammatical errors may occur.    Orders Placed This Encounter   Procedures     Follow-Up with Cardiologist     No orders of the defined types were placed in this encounter.    There are no discontinued medications.      Encounter Diagnosis   Name Primary?     History of CVA (cerebrovascular accident)        CURRENT MEDICATIONS:  Current Outpatient Prescriptions   Medication Sig Dispense Refill     atorvastatin (LIPITOR) 40 MG tablet Take 40 mg by mouth daily       clopidogrel (PLAVIX) 75 MG tablet Take 75 mg by mouth daily         ALLERGIES   No Known Allergies    PAST MEDICAL HISTORY:  Past Medical History:   Diagnosis Date     Cerebral infarction (H)      PFO (patent foramen ovale)        PAST SURGICAL HISTORY:  Past Surgical History:   Procedure Laterality Date     HERNIA REPAIR         FAMILY HISTORY:  Family History   Problem Relation Age of Onset     DIABETES Father      DM Type II       SOCIAL HISTORY:  Social History     Social  "History     Marital status:      Spouse name: N/A     Number of children: N/A     Years of education: N/A     Social History Main Topics     Smoking status: Never Smoker     Smokeless tobacco: Never Used     Alcohol use Yes      Comment: 2-3 drinks/week     Drug use: None     Sexual activity: Not Asked     Other Topics Concern     None     Social History Narrative       Review of Systems:  Skin:  Negative     Eyes:  Positive for glasses  ENT:  Negative    Respiratory:  Negative    Cardiovascular:  Negative    Gastroenterology: Negative    Genitourinary:  Negative    Musculoskeletal:  Negative    Neurologic:  Positive for stroke  Psychiatric:  Negative    Heme/Lymph/Imm:  Negative    Endocrine:  Negative      Physical Exam:  Vitals: /60  Pulse 60  Ht 1.854 m (6' 1\")  Wt 77 kg (169 lb 11.2 oz)  BMI 22.39 kg/m2    Constitutional:  cooperative, alert and oriented, well developed, well nourished, in no acute distress        Skin:  warm and dry to the touch, no apparent skin lesions or masses noted        Head:  normocephalic, no masses or lesions        Eyes:  pupils equal and round, conjunctivae and lids unremarkable, sclera white, no xanthalasma, EOMS intact, no nystagmus        ENT:           Neck:  carotid pulses are full and equal bilaterally        Chest:  normal breath sounds, clear to auscultation, normal A-P diameter, normal symmetry, normal respiratory excursion, no use of accessory muscles        Cardiac: regular rhythm, normal S1/S2, no S3 or S4, apical impulse not displaced, no murmurs, gallops or rubs                  Abdomen:  abdomen soft        Vascular: pulses full and equal, no bruits auscultated     right radial artery;2+             left radial artery;2+                  Extremities and Back:  no deformities, clubbing, cyanosis, erythema observed        Neurological:  no gross motor deficits   blindness on upper visual fields      Recent Lab Results:  LIPID RESULTS:  No results found " for: CHOL, HDL, LDL, TRIG, CHOLHDLRATIO    LIVER ENZYME RESULTS:  No results found for: AST, ALT    CBC RESULTS:  No results found for: WBC, RBC, HGB, HCT, MCV, MCH, MCHC, RDW, PLT    BMP RESULTS:  No results found for: NA, POTASSIUM, CHLORIDE, CO2, ANIONGAP, GLC, BUN, CR, GFRESTIMATED, GFRESTBLACK, JOSE     A1C RESULTS:  No results found for: A1C    INR RESULTS:  No results found for: INR        CC  Mychal Hargrove MD  [id number]  [place] [id number]

## 2018-06-14 NOTE — LETTER
6/14/2018    Kwaku Castro MD  [address]  [place] [address]    RE: Dean Duane Heuer       Dear Colleague,    I had the pleasure of seeing Dean Duane Heuer in the [facility].    HPI:  Dean Duane Heuer is a 58 year old male who is a patient of Dr. Hargrove.  He is a  in [employer] [facility].  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  An MRI there showed a right occipital infarct and 2 small cerebellar infarcts, mostly suggestive of embolic CVA.  The MRI showed no disease of his head and neck vessels.  The etiology was felt secondary to his PFO.  A PK was performed at [facility] showing an aneurysmal atrial septum with moderate shunting with Valsalva.  Back in 2012, they did not suggest closure, but put him on Plavix and Lipitor.   Dr. Loja was out of his network of care, therefore was referred to Dr. Hargrove.    He recently had a zio patch (5/9/2018) which showed no atrial fibrillation.  PK (5/9/2018)  Revealed EF 65%,  Bileaflet mitral valve prolapse with Mild to moderate mitral regurgitation, moderate, eccentric aortic regurgitation,  fossa ovalis portion of the intra-atrial septum appears aneurysmal and no evidence of inter-atrial shunt. Negative bubble study.    After the zio patch and now negative bubble study, Dr. Hargrove recommends a loop recorder be placed looking for atrial fibrillation.  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   Denies chest pain or pressure, headaches, dizziness, syncope, angina, dyspnea at rest or with exertion, dry cough, palpitations, orthopnea, PND, abdominal pain, abdominal edema, pedal edema, or claudication.  Denies easy bruising or bleeding,  hematuria, hematochezia, and epistaxis. Denies signs/symptoms of stroke such as visual disturbance, difficulty speaking, facial drooping, confusion, problems with gait, or any new numbness or weakness.  Drinks alcohol socially and denies tobacco use.  Denies sleep apnea symptoms.      ASSESSMENT AND PLAN    (Z86.73) History of CVA (cerebrovascular accident)   Had a cryptogenic CVA in 2012.  Recent negative bubble test and negative zio patch. Recommend placement of a loop recorder looking for paroxsymal atrial fibrillation.   Risks and benefits of the procedure were reviewed including but not limited to pain, infection, bleeding, reaction to the sedation. Alternatives were discussed including doing nothing. All questions were answered to the patient's satisfaction. Informed consent was obtained and patient wished to proceed.     Will have patient follow up with Dr. Hargrove in 4-6 months.       Patient expresses understanding and agreement with the plan.     I appreciate the chance to help with Dean Duane Heuer Please let me know if you have any questions or concerns.    Nicole Guillen APRN, CNP    This note was completed in part using Dragon voice recognition software. Although reviewed after completion, some word and grammatical errors may occur.    Orders Placed This Encounter   Procedures     Follow-Up with Cardiologist     No orders of the defined types were placed in this encounter.    There are no discontinued medications.      Encounter Diagnosis   Name Primary?     History of CVA (cerebrovascular accident)        CURRENT MEDICATIONS:  Current Outpatient Prescriptions   Medication Sig Dispense Refill     atorvastatin (LIPITOR) 40 MG tablet Take 40 mg by mouth daily       clopidogrel (PLAVIX) 75 MG tablet Take 75 mg by mouth daily         ALLERGIES   No Known Allergies    PAST MEDICAL HISTORY:  Past Medical History:   Diagnosis Date     Cerebral infarction (H)      PFO (patent foramen ovale)        PAST  "SURGICAL HISTORY:  Past Surgical History:   Procedure Laterality Date     HERNIA REPAIR         FAMILY HISTORY:  Family History   Problem Relation Age of Onset     DIABETES Father      DM Type II       SOCIAL HISTORY:  Social History     Social History     Marital status:      Spouse name: N/A     Number of children: N/A     Years of education: N/A     Social History Main Topics     Smoking status: Never Smoker     Smokeless tobacco: Never Used     Alcohol use Yes      Comment: 2-3 drinks/week     Drug use: None     Sexual activity: Not Asked     Other Topics Concern     None     Social History Narrative       Review of Systems:  Skin:  Negative     Eyes:  Positive for glasses  ENT:  Negative    Respiratory:  Negative    Cardiovascular:  Negative    Gastroenterology: Negative    Genitourinary:  Negative    Musculoskeletal:  Negative    Neurologic:  Positive for stroke  Psychiatric:  Negative    Heme/Lymph/Imm:  Negative    Endocrine:  Negative      Physical Exam:  Vitals: /60  Pulse 60  Ht 1.854 m (6' 1\")  Wt 77 kg (169 lb 11.2 oz)  BMI 22.39 kg/m2    Constitutional:  cooperative, alert and oriented, well developed, well nourished, in no acute distress        Skin:  warm and dry to the touch, no apparent skin lesions or masses noted        Head:  normocephalic, no masses or lesions        Eyes:  pupils equal and round, conjunctivae and lids unremarkable, sclera white, no xanthalasma, EOMS intact, no nystagmus        ENT:           Neck:  carotid pulses are full and equal bilaterally        Chest:  normal breath sounds, clear to auscultation, normal A-P diameter, normal symmetry, normal respiratory excursion, no use of accessory muscles        Cardiac: regular rhythm, normal S1/S2, no S3 or S4, apical impulse not displaced, no murmurs, gallops or rubs                  Abdomen:  abdomen soft        Vascular: pulses full and equal, no bruits auscultated     right radial artery;2+             left " radial artery;2+                  Extremities and Back:  no deformities, clubbing, cyanosis, erythema observed        Neurological:  no gross motor deficits   blindness on upper visual fields      Recent Lab Results:  LIPID RESULTS:  No results found for: CHOL, HDL, LDL, TRIG, CHOLHDLRATIO    LIVER ENZYME RESULTS:  No results found for: AST, ALT    CBC RESULTS:  No results found for: WBC, RBC, HGB, HCT, MCV, MCH, MCHC, RDW, PLT    BMP RESULTS:  No results found for: NA, POTASSIUM, CHLORIDE, CO2, ANIONGAP, GLC, BUN, CR, GFRESTIMATED, GFRESTBLACK, JOSE     A1C RESULTS:  No results found for: A1C    INR RESULTS:  No results found for: INR        Thank you for allowing me to participate in the care of your patient.    Sincerely,     DANILO Ling [facility]

## 2018-06-22 DIAGNOSIS — I63.9 CEREBROVASCULAR ACCIDENT (H): Primary | ICD-10-CM

## 2018-06-22 RX ORDER — LIDOCAINE 40 MG/G
CREAM TOPICAL
Status: CANCELLED | OUTPATIENT
Start: 2018-06-22

## 2018-06-22 RX ORDER — SODIUM CHLORIDE 450 MG/100ML
INJECTION, SOLUTION INTRAVENOUS CONTINUOUS
Status: CANCELLED | OUTPATIENT
Start: 2018-06-22

## 2018-06-25 ENCOUNTER — TELEPHONE (OUTPATIENT)
Dept: CARDIOLOGY | Facility: CLINIC | Age: 58
End: 2018-06-25

## 2018-06-25 NOTE — TELEPHONE ENCOUNTER
Called patient with pre-procedure instructions for device implant:      Anticoagulation: NA  Oral diabetes meds: NA  Insulin: NA  Diuretic: NA  Contrast allergy: NA  Pt informed to be NPO at midnight/ PER SCHEDULING INSTRUCTION  (if procedure scheduled 1pm or later, may have clear liquid breakfast before 8am)    Pt has post-procedure transportation and 24 hours monitoring set up.   Pt aware of no driving for 24 hours post procedure due to sedation.   Pt aware of arrival time and location. Pt verbalized understanding of instructions. sml

## 2018-06-26 ENCOUNTER — HOSPITAL ENCOUNTER (OUTPATIENT)
Facility: CLINIC | Age: 58
Discharge: HOME | End: 2018-06-26
Attending: INTERNAL MEDICINE | Admitting: INTERNAL MEDICINE
Payer: COMMERCIAL

## 2018-06-26 ENCOUNTER — APPOINTMENT (OUTPATIENT)
Dept: CARDIOLOGY | Facility: CLINIC | Age: 58
End: 2018-06-26
Attending: INTERNAL MEDICINE
Payer: COMMERCIAL

## 2018-06-26 VITALS
DIASTOLIC BLOOD PRESSURE: 58 MMHG | RESPIRATION RATE: 16 BRPM | HEART RATE: 58 BPM | WEIGHT: 168.6 LBS | BODY MASS INDEX: 22.84 KG/M2 | TEMPERATURE: 95.9 F | SYSTOLIC BLOOD PRESSURE: 117 MMHG | HEIGHT: 72 IN | OXYGEN SATURATION: 98 %

## 2018-06-26 DIAGNOSIS — Z86.73 HISTORY OF CVA (CEREBROVASCULAR ACCIDENT): ICD-10-CM

## 2018-06-26 LAB
ERYTHROCYTE [DISTWIDTH] IN BLOOD BY AUTOMATED COUNT: 12.4 % (ref 10–15)
HCT VFR BLD AUTO: 40.8 % (ref 40–53)
HGB BLD-MCNC: 14 G/DL (ref 13.3–17.7)
MCH RBC QN AUTO: 30.4 PG (ref 26.5–33)
MCHC RBC AUTO-ENTMCNC: 34.3 G/DL (ref 31.5–36.5)
MCV RBC AUTO: 89 FL (ref 78–100)
PLATELET # BLD AUTO: 185 10E9/L (ref 150–450)
RBC # BLD AUTO: 4.61 10E12/L (ref 4.4–5.9)
WBC # BLD AUTO: 4.4 10E9/L (ref 4–11)

## 2018-06-26 PROCEDURE — 25000125 ZZHC RX 250: Performed by: INTERNAL MEDICINE

## 2018-06-26 PROCEDURE — 33282 ZZHC LOOP RECORDER IMPLANT: CPT | Performed by: INTERNAL MEDICINE

## 2018-06-26 PROCEDURE — 27210847 ZZH KIT ACCESS PPM ICD CR2

## 2018-06-26 PROCEDURE — 99153 MOD SED SAME PHYS/QHP EA: CPT

## 2018-06-26 PROCEDURE — 25000128 H RX IP 250 OP 636: Performed by: INTERNAL MEDICINE

## 2018-06-26 PROCEDURE — 99152 MOD SED SAME PHYS/QHP 5/>YRS: CPT | Performed by: INTERNAL MEDICINE

## 2018-06-26 PROCEDURE — C1764 EVENT RECORDER, CARDIAC: HCPCS

## 2018-06-26 PROCEDURE — 85027 COMPLETE CBC AUTOMATED: CPT | Performed by: INTERNAL MEDICINE

## 2018-06-26 PROCEDURE — 33282 ZZHC LOOP RECORDER IMPLANT: CPT

## 2018-06-26 PROCEDURE — 40000852 ZZH STATISTIC HEART CATH LAB OR EP LAB

## 2018-06-26 PROCEDURE — 27210995 ZZH RX 272: Performed by: INTERNAL MEDICINE

## 2018-06-26 PROCEDURE — 36415 COLL VENOUS BLD VENIPUNCTURE: CPT

## 2018-06-26 PROCEDURE — 99152 MOD SED SAME PHYS/QHP 5/>YRS: CPT

## 2018-06-26 RX ORDER — CEFAZOLIN SODIUM 2 G/100ML
2 INJECTION, SOLUTION INTRAVENOUS ONCE
Status: COMPLETED | OUTPATIENT
Start: 2018-06-26 | End: 2018-06-26

## 2018-06-26 RX ORDER — KETOROLAC TROMETHAMINE 30 MG/ML
15-30 INJECTION, SOLUTION INTRAMUSCULAR; INTRAVENOUS
Status: DISCONTINUED | OUTPATIENT
Start: 2018-06-26 | End: 2018-06-26 | Stop reason: HOSPADM

## 2018-06-26 RX ORDER — OXYCODONE AND ACETAMINOPHEN 5; 325 MG/1; MG/1
1 TABLET ORAL EVERY 4 HOURS PRN
Status: DISCONTINUED | OUTPATIENT
Start: 2018-06-26 | End: 2018-06-26 | Stop reason: HOSPADM

## 2018-06-26 RX ORDER — NALOXONE HYDROCHLORIDE 0.4 MG/ML
0.4 INJECTION, SOLUTION INTRAMUSCULAR; INTRAVENOUS; SUBCUTANEOUS EVERY 5 MIN PRN
Status: DISCONTINUED | OUTPATIENT
Start: 2018-06-26 | End: 2018-06-26 | Stop reason: HOSPADM

## 2018-06-26 RX ORDER — NALOXONE HYDROCHLORIDE 0.4 MG/ML
.1-.4 INJECTION, SOLUTION INTRAMUSCULAR; INTRAVENOUS; SUBCUTANEOUS
Status: DISCONTINUED | OUTPATIENT
Start: 2018-06-26 | End: 2018-06-26 | Stop reason: HOSPADM

## 2018-06-26 RX ORDER — PROTAMINE SULFATE 10 MG/ML
5-40 INJECTION, SOLUTION INTRAVENOUS EVERY 10 MIN PRN
Status: DISCONTINUED | OUTPATIENT
Start: 2018-06-26 | End: 2018-06-26 | Stop reason: HOSPADM

## 2018-06-26 RX ORDER — LIDOCAINE HYDROCHLORIDE 10 MG/ML
10-30 INJECTION, SOLUTION EPIDURAL; INFILTRATION; INTRACAUDAL; PERINEURAL
Status: DISCONTINUED | OUTPATIENT
Start: 2018-06-26 | End: 2018-06-26 | Stop reason: HOSPADM

## 2018-06-26 RX ORDER — PROTAMINE SULFATE 10 MG/ML
1-5 INJECTION, SOLUTION INTRAVENOUS
Status: DISCONTINUED | OUTPATIENT
Start: 2018-06-26 | End: 2018-06-26 | Stop reason: HOSPADM

## 2018-06-26 RX ORDER — FENTANYL CITRATE 50 UG/ML
25-50 INJECTION, SOLUTION INTRAMUSCULAR; INTRAVENOUS
Status: DISCONTINUED | OUTPATIENT
Start: 2018-06-26 | End: 2018-06-26 | Stop reason: HOSPADM

## 2018-06-26 RX ORDER — BUPIVACAINE HYDROCHLORIDE 2.5 MG/ML
10-30 INJECTION, SOLUTION EPIDURAL; INFILTRATION; INTRACAUDAL
Status: DISCONTINUED | OUTPATIENT
Start: 2018-06-26 | End: 2018-06-26 | Stop reason: HOSPADM

## 2018-06-26 RX ORDER — FUROSEMIDE 10 MG/ML
20-100 INJECTION INTRAMUSCULAR; INTRAVENOUS
Status: DISCONTINUED | OUTPATIENT
Start: 2018-06-26 | End: 2018-06-26 | Stop reason: HOSPADM

## 2018-06-26 RX ORDER — DOBUTAMINE HYDROCHLORIDE 200 MG/100ML
5-40 INJECTION INTRAVENOUS CONTINUOUS PRN
Status: DISCONTINUED | OUTPATIENT
Start: 2018-06-26 | End: 2018-06-26 | Stop reason: HOSPADM

## 2018-06-26 RX ORDER — LIDOCAINE HYDROCHLORIDE 10 MG/ML
10-30 INJECTION, SOLUTION EPIDURAL; INFILTRATION; INTRACAUDAL; PERINEURAL
Status: COMPLETED | OUTPATIENT
Start: 2018-06-26 | End: 2018-06-26

## 2018-06-26 RX ORDER — MORPHINE SULFATE 2 MG/ML
1-2 INJECTION, SOLUTION INTRAMUSCULAR; INTRAVENOUS EVERY 5 MIN PRN
Status: DISCONTINUED | OUTPATIENT
Start: 2018-06-26 | End: 2018-06-26 | Stop reason: HOSPADM

## 2018-06-26 RX ORDER — LORAZEPAM 2 MG/ML
.5-2 INJECTION INTRAMUSCULAR EVERY 10 MIN PRN
Status: DISCONTINUED | OUTPATIENT
Start: 2018-06-26 | End: 2018-06-26 | Stop reason: HOSPADM

## 2018-06-26 RX ORDER — LIDOCAINE 40 MG/G
CREAM TOPICAL
Status: DISCONTINUED | OUTPATIENT
Start: 2018-06-26 | End: 2018-06-26 | Stop reason: HOSPADM

## 2018-06-26 RX ORDER — ATROPINE SULFATE 0.1 MG/ML
.5-1 INJECTION INTRAVENOUS
Status: DISCONTINUED | OUTPATIENT
Start: 2018-06-26 | End: 2018-06-26 | Stop reason: HOSPADM

## 2018-06-26 RX ORDER — IBUTILIDE FUMARATE 1 MG/10ML
0.01 INJECTION, SOLUTION INTRAVENOUS
Status: DISCONTINUED | OUTPATIENT
Start: 2018-06-26 | End: 2018-06-26 | Stop reason: HOSPADM

## 2018-06-26 RX ORDER — ADENOSINE 3 MG/ML
6-12 INJECTION, SOLUTION INTRAVENOUS EVERY 5 MIN PRN
Status: DISCONTINUED | OUTPATIENT
Start: 2018-06-26 | End: 2018-06-26 | Stop reason: HOSPADM

## 2018-06-26 RX ORDER — ONDANSETRON 2 MG/ML
4 INJECTION INTRAMUSCULAR; INTRAVENOUS EVERY 4 HOURS PRN
Status: DISCONTINUED | OUTPATIENT
Start: 2018-06-26 | End: 2018-06-26 | Stop reason: HOSPADM

## 2018-06-26 RX ORDER — IBUTILIDE FUMARATE 1 MG/10ML
1 INJECTION, SOLUTION INTRAVENOUS
Status: DISCONTINUED | OUTPATIENT
Start: 2018-06-26 | End: 2018-06-26 | Stop reason: HOSPADM

## 2018-06-26 RX ORDER — LIDOCAINE HYDROCHLORIDE AND EPINEPHRINE 10; 10 MG/ML; UG/ML
10-30 INJECTION, SOLUTION INFILTRATION; PERINEURAL
Status: DISCONTINUED | OUTPATIENT
Start: 2018-06-26 | End: 2018-06-26 | Stop reason: HOSPADM

## 2018-06-26 RX ORDER — DIPHENHYDRAMINE HYDROCHLORIDE 50 MG/ML
25-50 INJECTION INTRAMUSCULAR; INTRAVENOUS
Status: DISCONTINUED | OUTPATIENT
Start: 2018-06-26 | End: 2018-06-26 | Stop reason: HOSPADM

## 2018-06-26 RX ORDER — SODIUM CHLORIDE 450 MG/100ML
INJECTION, SOLUTION INTRAVENOUS CONTINUOUS
Status: DISCONTINUED | OUTPATIENT
Start: 2018-06-26 | End: 2018-06-26 | Stop reason: HOSPADM

## 2018-06-26 RX ORDER — HEPARIN SODIUM 1000 [USP'U]/ML
1000-10000 INJECTION, SOLUTION INTRAVENOUS; SUBCUTANEOUS EVERY 5 MIN PRN
Status: DISCONTINUED | OUTPATIENT
Start: 2018-06-26 | End: 2018-06-26 | Stop reason: HOSPADM

## 2018-06-26 RX ORDER — FLUMAZENIL 0.1 MG/ML
0.2 INJECTION, SOLUTION INTRAVENOUS
Status: DISCONTINUED | OUTPATIENT
Start: 2018-06-26 | End: 2018-06-26 | Stop reason: HOSPADM

## 2018-06-26 RX ADMIN — LIDOCAINE HYDROCHLORIDE 8 ML: 10 INJECTION, SOLUTION EPIDURAL; INFILTRATION; INTRACAUDAL; PERINEURAL at 13:42

## 2018-06-26 RX ADMIN — CEFAZOLIN SODIUM 2 G: 2 INJECTION, SOLUTION INTRAVENOUS at 13:30

## 2018-06-26 RX ADMIN — MIDAZOLAM 1 MG: 1 INJECTION INTRAMUSCULAR; INTRAVENOUS at 13:40

## 2018-06-26 RX ADMIN — FENTANYL CITRATE 50 MCG: 50 INJECTION, SOLUTION INTRAMUSCULAR; INTRAVENOUS at 13:40

## 2018-06-26 RX ADMIN — SODIUM CHLORIDE: 4.5 INJECTION, SOLUTION INTRAVENOUS at 11:35

## 2018-06-26 NOTE — IP AVS SNAPSHOT
[place] [address]    [place] [address]    Phone:  [phone]                                       After Visit Summary   6/26/2018    Tony Duane Heuer    MRN: [id number]           After Visit Summary Signature Page     I have received my discharge instructions, and my questions have been answered. I have discussed any challenges I see with this plan with the nurse or doctor.    ..........................................................................................................................................  Patient/Patient Representative Signature      ..........................................................................................................................................  Patient Representative Print Name and Relationship to Patient    ..................................................               ................................................  Date                                            Time    ..........................................................................................................................................  Reviewed by Signature/Title    ...................................................              ..............................................  Date                                                            Time

## 2018-06-26 NOTE — DISCHARGE INSTRUCTIONS
Loop Recorder Discharge Instructions    After you go home:      Have an adult stay with you for 6 hours.    You may resume your normal diet.       For 24 hours - due to the sedation you received:    Relax and take it easy.    Do NOT make any important or legal decisions.    Do NOT drive or operate machines at home or at work.    Do NOT drink alcohol.    Care of Chest Incision:      Keep the bandage on for 3 days. You may remove the dressing on 6/29. Change it only if it gets loose or soaked. If you need to change it, use 4x4-inch gauze and a large clear bandage.     Leave the strips of tape on. They will fall off on their own, or we will remove them at your first check-up.    Check your wound daily for signs of infection, such as increased redness, severe swelling or draining. Fever may also be a sign of infection. Call us if you see any of these signs.    If there are no signs of infection, you may shower after the bandage comes off in 3 days. If you take a tub bath, keep the wound dry.    No soaking the incision (swimming pool, bathtub, hot tub) for 2 weeks.    You may have mild to medium pain for 3 to 5 days. Take Acetaminophen (Tylenol) or Ibuprofen (Advil) for the pain. If the pain persists or is severe, call us.    Activity:      Avoid strenuous activity until incision well healed.    Bleeding:      If you start bleeding from the incision site, sit down and press firmly on the site for 10 minutes.     Once bleeding stops, call [facility] as soon as you can.       Call 911 right away if you have heavy bleeding or bleeding that does not stop.      Medicines:      Take your medications, including blood thinners, unless your provider tells you not to.    If you have stopped any medicines, check with your provider about when to restart them.    Follow Up Appointments:      Follow up with Device Clinic at [facility] of patient preference in 7-10 days.    Call the clinic if:      You have a large or  growing hard lump around the site.    The site is red, swollen, hot or tender.    Blood or fluid is draining from the site.    You have chills or a fever greater than 101 F (38 C).    You feel dizzy or light-headed.    Questions or concerns.    Telling others about your device:      Before you leave the hospital, you will receive a temporary ID card. A permanent card will be mailed to you about 6 to 8 weeks later. Always carry the ID card with you. It has important details about your device.    You may also get a Medical Alert bracelet or tag that says you have a loop recorder.  Go to [email or web address].     Always tell doctors, dentists and other care providers that you have a device implanted in you.    Let us know before you plan any surgeries. Your care team must take special steps to keep you safe during certain procedures. These steps will depend on the type of device you have. Your provider will need to see your ID card. They may need to call us for instructions.    Device Safety:      Please refer to device  s booklet for further information.        [facility] [place]:    [phone] [facility] (7 days a week)

## 2018-06-26 NOTE — IP AVS SNAPSHOT
MRN:[id number]                      After Visit Summary   6/26/2018    Tony Duane Heuer    MRN: [id number]           Visit Information        Department      6/26/2018 10:54 AM [facility]          Review of your medicines      CONTINUE these medicines which have NOT CHANGED        Dose / Directions    aspirin 81 MG tablet        Dose:  81 mg   Take 81 mg by mouth daily   Refills:  0       LIPITOR 40 MG tablet   Generic drug:  atorvastatin        Dose:  40 mg   Take 40 mg by mouth daily   Refills:  0       PLAVIX 75 MG tablet   Generic drug:  clopidogrel        Dose:  75 mg   Take 75 mg by mouth daily   Refills:  0                Protect others around you: Learn how to safely use, store and throw away your medicines at www.disposemymeds.org.         Follow-ups after your visit         Care Instructions        After Care Instructions     Discharge Instructions - Follow up with Device Check RN        Follow up with Device Check RN in 7-10 days.            Discharge Instructions - Keep incision dry for 72 hours       Keep incision dry for 72 hours (unless Derma Rodgers was applied)                  Further instructions from your care team         Loop Recorder Discharge Instructions    After you go home:      Have an adult stay with you for 6 hours.    You may resume your normal diet.       For 24 hours - due to the sedation you received:    Relax and take it easy.    Do NOT make any important or legal decisions.    Do NOT drive or operate machines at home or at work.    Do NOT drink alcohol.    Care of Chest Incision:      Keep the bandage on for 3 days. You may remove the dressing on 6/29. Change it only if it gets loose or soaked. If you need to change it, use 4x4-inch gauze and a large clear bandage.     Leave the strips of tape on. They will fall off on their own, or we will remove them at your first check-up.    Check your wound daily for signs of infection, such as increased  redness, severe swelling or draining. Fever may also be a sign of infection. Call us if you see any of these signs.    If there are no signs of infection, you may shower after the bandage comes off in 3 days. If you take a tub bath, keep the wound dry.    No soaking the incision (swimming pool, bathtub, hot tub) for 2 weeks.    You may have mild to medium pain for 3 to 5 days. Take Acetaminophen (Tylenol) or Ibuprofen (Advil) for the pain. If the pain persists or is severe, call us.    Activity:      Avoid strenuous activity until incision well healed.    Bleeding:      If you start bleeding from the incision site, sit down and press firmly on the site for 10 minutes.     Once bleeding stops, call [facility] as soon as you can.       Call 911 right away if you have heavy bleeding or bleeding that does not stop.      Medicines:      Take your medications, including blood thinners, unless your provider tells you not to.    If you have stopped any medicines, check with your provider about when to restart them.    Follow Up Appointments:      Follow up with Device Clinic at [facility] of patient preference in 7-10 days.    Call the clinic if:      You have a large or growing hard lump around the site.    The site is red, swollen, hot or tender.    Blood or fluid is draining from the site.    You have chills or a fever greater than 101 F (38 C).    You feel dizzy or light-headed.    Questions or concerns.    Telling others about your device:      Before you leave the hospital, you will receive a temporary ID card. A permanent card will be mailed to you about 6 to 8 weeks later. Always carry the ID card with you. It has important details about your device.    You may also get a Medical Alert bracelet or tag that says you have a loop recorder.  Go to [email or web address].     Always tell doctors, dentists and other care providers that you have a device implanted in you.    Let us know before you plan any  surgeries. Your care team must take special steps to keep you safe during certain procedures. These steps will depend on the type of device you have. Your provider will need to see your ID card. They may need to call us for instructions.    Device Safety:      Please refer to device  s booklet for further information.        [facility] [place]:    [phone] UMP (7 days a week)           Additional Information About Your Visit        Care EveryWhere ID     This is your Care EveryWhere ID. This could be used by other organizations to access your [place] medical records  [id number]        Your Vitals Were     Blood Pressure Pulse Temperature Respirations Height Weight    117/58 58 95.9  F (35.5  C) (Oral) 16 1.829 m (6') 76.5 kg (168 lb 9.6 oz)    Pulse Oximetry BMI (Body Mass Index)                98% 22.87 kg/m2           Primary Care Provider Office Phone # Fax #    Kwaku Castro [phone]      Equal Access to Services     MONICA SALEH : Hadii aad ku hadasho Soomaali, waaxda luqadaha, qaybta kaalmada adeegyada, waxay idiin haypatrick vargas . So [facility] [phone].    ATENCIÓN: Si habla español, tiene a damon disposición servicios gratuitos de asistencia lingüística. Llame al [phone].    We comply with applicable federal civil rights laws and [place] laws. We do not discriminate on the basis of race, color, national origin, age, disability, sex, sexual orientation, or gender identity.            Thank you!     Thank you for choosing [place] for your care. Our goal is always to provide you with excellent care. Hearing back from our patients is one way we can continue to improve our services. Please take a few minutes to complete the written survey that you may receive in the mail after you visit with us. Thank you!             Medication List: This is a list of all your medications and when to take them. Check marks below  indicate your daily home schedule. Keep this list as a reference.      Medications           Morning Afternoon Evening Bedtime As Needed    aspirin 81 MG tablet   Take 81 mg by mouth daily                                LIPITOR 40 MG tablet   Take 40 mg by mouth daily   Generic drug:  atorvastatin                                PLAVIX 75 MG tablet   Take 75 mg by mouth daily   Generic drug:  clopidogrel

## 2018-06-26 NOTE — PROGRESS NOTES
Care Suit[facility] Arrival    Reason for Visit: loop procedure    A/O. Denies pain. Labs WNL. IV flds infusing.     Skin assess issues - none    Pt resting in bed, denies additional needs at this time, call light within reach. Family at bedside.  Wife will stay with pt overnight.       at bedside to speak with pt & spouse Consent signed at this time.     Pt to procedure area at this time.

## 2018-06-26 NOTE — PROGRESS NOTES
Stable post medtronic loop recorder implant. Chest drsg C/D/I. [employer] contacted to alert rep patient is ready for discharge. Discharge instructions reviewed by Vinny Lin Discharged to home with wife. Seen by medtronic rep and loop recorder supplies were sent home with patient.

## 2018-06-26 NOTE — PROGRESS NOTES
Loop recorder implantation.  No complications.  May be discharged around 3 PM.  Continue cardiology follow up with Dr. Hargrove.

## 2018-06-27 ENCOUNTER — TELEPHONE (OUTPATIENT)
Dept: CARDIOLOGY | Facility: CLINIC | Age: 58
End: 2018-06-27

## 2018-06-27 NOTE — TELEPHONE ENCOUNTER
Post loop implant discharge phone call.    Reviewed the following:  Remove outer dressing 3 days after implant. May shower after outer dressing removed. Leave steri-strips in place, will be removed at 1 week device check  Watch for redness, drainage, warmth, or fever. Call device clinic if any signs of infection.     1 week device check scheduled: July 10 at 1:00    Pt states understanding of all instructions. Renetta Jimenez RN

## 2018-07-10 ENCOUNTER — ALLIED HEALTH/NURSE VISIT (OUTPATIENT)
Dept: CARDIOLOGY | Facility: CLINIC | Age: 58
End: 2018-07-10
Payer: COMMERCIAL

## 2018-07-10 DIAGNOSIS — I63.9 CEREBROVASCULAR ACCIDENT (H): Primary | ICD-10-CM

## 2018-07-10 DIAGNOSIS — Z45.09 ENCOUNTER FOR LOOP RECORDER CHECK: ICD-10-CM

## 2018-07-10 PROCEDURE — 93291 INTERROG DEV EVAL SCRMS IP: CPT | Performed by: INTERNAL MEDICINE

## 2018-07-10 NOTE — PROGRESS NOTES
Medtronic Reveal Linq Loop Recorder   Symptom: 0 Tachy: 0 Pause: 0 Joseph: 0  AT: 0 AF: 0  Time in AT/AF: 0 %  Heart rate: Stable with excellent variability Battery: Full  Careplan: Begin Q 3 month remote transmissions. Seeing Nicole in August (Order pending). ДМИТРИЙ Hanks    .

## 2018-07-10 NOTE — MR AVS SNAPSHOT
After Visit Summary   7/10/2018    Tonyn Duane Heuer    MRN: [id number]           Patient Information     Date Of Birth          1960        Visit Information        Provider Department      7/10/2018 1:00 PM DELANEY DCR2 [facility]        Today's Diagnoses     Cerebrovascular accident (H)    -  1    Encounter for loop recorder check           Follow-ups after your visit        Your next 10 appointments already scheduled     Nov 01, 2018  4:30 PM CDT   Remote ICD Check with DELANEY DCR2   [facility] ([facility])    [address] [place] [address] [address]  [facility] [id number] [phone] OPT 2           This appointment is for a remote check of your debrillator.  This is not an appointment at the office.              Who to contact     If you have questions or need follow up information about today's clinic visit or your schedule please contact [facility] directly at [phone].  Normal or non-critical lab and imaging results will be communicated to you by MyChart, letter or phone within 4 business days after the clinic has received the results. If you do not hear from us within 7 days, please contact the clinic through [employer] or phone. If you have a critical or abnormal lab result, we will notify you by phone as soon as possible.  Submit refill requests through [employer] or call your pharmacy and they will forward the refill request to us. Please allow 3 business days for your refill to be completed.          Additional Information About Your Visit        Care EveryWhere ID     This is your Care EveryWhere ID. This could be used by other organizations to access your [place] medical records  [id number]         Blood Pressure from Last 3 Encounters:   06/26/18 117/58   06/14/18 122/60   05/09/18 121/74    Weight from Last 3 Encounters:   06/26/18 76.5 kg (168 lb 9.6 oz)   06/14/18 77 kg  (169 lb 11.2 oz)   05/09/18 77.1 kg (170 lb)              We Performed the Following     INTERR DEVICE EVAL IN PERSON, ILR ([id number])        Primary Care Provider Office Phone # Fax #    Kwaku Castro [phone]       [address]  [facility] [address]        Equal Access to Services     [facility] : Hadii aad ku hadasho Soomaali, waaxda luqadaha, qaybta kaalmada adeegyada, waxay idiin hayaan adeeg kharash la'aan ah. So [facility] [phone].    ATENCIÓN: Si habla español, tiene a damon disposición servicios gratuitos de asistencia lingüística. Kathyame al [phone].    We comply with applicable federal civil rights laws and [place] laws. We do not discriminate on the basis of race, color, national origin, age, disability, sex, sexual orientation, or gender identity.            Thank you!     Thank you for choosing [facility]  for your care. Our goal is always to provide you with excellent care. Hearing back from our patients is one way we can continue to improve our services. Please take a few minutes to complete the written survey that you may receive in the mail after your visit with us. Thank you!             Your Updated Medication List - Protect others around you: Learn how to safely use, store and throw away your medicines at www.disposemymeds.org.          This list is accurate as of 7/10/18  1:32 PM.  Always use your most recent med list.                   Brand Name Dispense Instructions for use Diagnosis    aspirin 81 MG tablet      Take 81 mg by mouth daily        LIPITOR 40 MG tablet   Generic drug:  atorvastatin      Take 40 mg by mouth daily        PLAVIX 75 MG tablet   Generic drug:  clopidogrel      Take 75 mg by mouth daily

## 2018-10-04 ENCOUNTER — TELEPHONE (OUTPATIENT)
Dept: CARDIOLOGY | Facility: CLINIC | Age: 58
End: 2018-10-04

## 2018-10-04 NOTE — TELEPHONE ENCOUNTER
Courtesy check : no charge. Medtronic loop recorder alert for a pause on 10/1/18 at 8:46 am and lasted 4 sec.  Called pt and he did not have any symptoms at that time. Rhythm appears SR with loss of QRS signal. Reviewed with Dr Louise and he agrees. Will cont to monitor. Romeo

## 2018-10-31 ENCOUNTER — ALLIED HEALTH/NURSE VISIT (OUTPATIENT)
Dept: CARDIOLOGY | Facility: CLINIC | Age: 58
End: 2018-10-31
Payer: COMMERCIAL

## 2018-10-31 DIAGNOSIS — Z86.73 HISTORY OF CVA (CEREBROVASCULAR ACCIDENT): ICD-10-CM

## 2018-10-31 DIAGNOSIS — Z45.09 ENCOUNTER FOR LOOP RECORDER CHECK: Primary | ICD-10-CM

## 2018-10-31 PROCEDURE — 93299 C ICM/ILR REMOTE TECH SERV: CPT | Performed by: INTERNAL MEDICINE

## 2018-10-31 PROCEDURE — 93297 REM INTERROG DEV EVAL ICPMS: CPT | Performed by: INTERNAL MEDICINE

## 2018-10-31 NOTE — MR AVS SNAPSHOT
After Visit Summary   10/31/2018    Dean Duane Heuer    MRN: [id number]           Patient Information     Date Of Birth          1960        Visit Information        Provider Department      10/31/2018 4:30 PM DELANEY VITOR2 [facility]        Today's Diagnoses     Encounter for loop recorder check    -  1    History of CVA (cerebrovascular accident)           Follow-ups after your visit        Your next 10 appointments already scheduled     Nov 13, 2018 10:50 AM CST   Return Visit with DANILO Milian CNP   [facility] ([facility])    [id number]  [facility] [address] [phone] OPT 2            Feb 26, 2019  4:30 PM CST   Remote ICD Check with DELANEY DCR2   [facility] ([facility])    [id number]  [facility] [id number] [phone] OPT 2           This appointment is for a remote check of your debrillator.  This is not an appointment at the office.              Who to contact     If you have questions or need follow up information about today's clinic visit or your schedule please contact [facility] directly at [phone].  Normal or non-critical lab and imaging results will be communicated to you by [employer], letter or phone within 4 business days after the clinic has received the results. If you do not hear from us within 7 days, please contact the clinic through [employer] or phone. If you have a critical or abnormal lab result, we will notify you by phone as soon as possible.  Submit refill requests through [employer] or call your pharmacy and they will forward the refill request to us. Please allow 3 business days for your refill to be completed.          Additional Information About Your Visit        IRL Gaming[employer] Diagnostics Information     [employer] lets you send messages to your doctor, view your  "test results, renew your prescriptions, schedule appointments and more. To sign up, go to [email or web address] . Click on \"Log in\" on the left side of the screen, which will take you to the Welcome page. Then click on \"Sign up Now\" on the right side of the page.     You will be asked to enter the access code listed below, as well as some personal information. Please follow the directions to create your username and password.     Your access code is: [email or web address]  Expires: 2019  7:02 AM     Your access code will  in 90 days. If you need help or a new code, please call your [place] clinic or [phone].        Care EveryWhere ID     This is your Care EveryWhere ID. This could be used by other organizations to access your [place] medical records  [id number]         Blood Pressure from Last 3 Encounters:   18 117/58   18 122/60   18 121/74    Weight from Last 3 Encounters:   18 76.5 kg (168 lb 9.6 oz)   18 77 kg (169 lb 11.2 oz)   18 77.1 kg (170 lb)              We Performed the Following     C ICM DEVICE INTERROGAT REMOTE ([id number])     ILR REMOTE TECH SERV ([id number])        Primary Care Provider Office Phone # Fax #    Kwaku Castro [phone]       [address] [address]        Equal Access to Services     [facility] AH: Hadii aad ku hadasho Soomaali, waaxda luqadaha, qaybta kaalmada adeegyada, waxay bartolo man. So [facility] [phone].    ATENCIÓN: Si habla español, tiene a damon disposición servicios gratuitos de asistencia lingüística. Llame al [phone].    We comply with applicable federal civil rights laws and [place] laws. We do not discriminate on the basis of race, color, national origin, age, disability, sex, sexual orientation, or gender identity.            Thank you!     Thank you for choosing [facility]  for your care. Our goal is always to " provide you with excellent care. Hearing back from our patients is one way we can continue to improve our services. Please take a few minutes to complete the written survey that you may receive in the mail after your visit with us. Thank you!             Your Updated Medication List - Protect others around you: Learn how to safely use, store and throw away your medicines at www.disposemymeds.org.          This list is accurate as of 10/31/18 11:59 PM.  Always use your most recent med list.                   Brand Name Dispense Instructions for use Diagnosis    aspirin 81 MG tablet      Take 81 mg by mouth daily        LIPITOR 40 MG tablet   Generic drug:  atorvastatin      Take 40 mg by mouth daily        PLAVIX 75 MG tablet   Generic drug:  clopidogrel      Take 75 mg by mouth daily

## 2018-11-01 NOTE — PROGRESS NOTES
Medtronic Reveal Linq Loop Recorder   Symptom: 0 Tachy: 0 Pause: 6 Joseph: 0  AT: 0 AF: 0  Time in AT/AF: 0 %  Heart rate: Stable with good variability Battery: OK  Careplan: All pause events were undersensing of QRS complexes, no true pauses. Looking for PAF due to Cryptogenic stroke. Next remote in 3 months. ДМИТРИЙ Hanks

## 2018-11-12 PROBLEM — Z95.818 STATUS POST PLACEMENT OF IMPLANTABLE LOOP RECORDER: Status: ACTIVE | Noted: 2018-11-12

## 2018-11-12 PROBLEM — I63.9 CVA (CEREBRAL VASCULAR ACCIDENT) (H): Status: ACTIVE | Noted: 2018-11-12

## 2018-11-12 PROBLEM — I34.1 MITRAL REGURGITATION DUE TO CUSP PROLAPSE: Status: ACTIVE | Noted: 2018-11-12

## 2018-11-12 PROBLEM — I34.1 MITRAL VALVE PROLAPSE: Status: ACTIVE | Noted: 2018-11-12

## 2018-11-12 PROBLEM — I34.0 MITRAL REGURGITATION DUE TO CUSP PROLAPSE: Status: ACTIVE | Noted: 2018-11-12

## 2018-11-12 PROBLEM — Q21.12 PFO (PATENT FORAMEN OVALE): Status: ACTIVE | Noted: 2018-11-12

## 2018-11-13 ENCOUNTER — OFFICE VISIT (OUTPATIENT)
Dept: CARDIOLOGY | Facility: CLINIC | Age: 58
End: 2018-11-13
Attending: NURSE PRACTITIONER
Payer: COMMERCIAL

## 2018-11-13 VITALS
BODY MASS INDEX: 22.4 KG/M2 | HEART RATE: 75 BPM | DIASTOLIC BLOOD PRESSURE: 60 MMHG | SYSTOLIC BLOOD PRESSURE: 120 MMHG | WEIGHT: 169 LBS | HEIGHT: 73 IN

## 2018-11-13 DIAGNOSIS — Z86.73 HISTORY OF CVA (CEREBROVASCULAR ACCIDENT): ICD-10-CM

## 2018-11-13 DIAGNOSIS — I34.0 MITRAL VALVE INSUFFICIENCY, UNSPECIFIED ETIOLOGY: Primary | ICD-10-CM

## 2018-11-13 DIAGNOSIS — Q21.12 PFO (PATENT FORAMEN OVALE): ICD-10-CM

## 2018-11-13 PROCEDURE — 99214 OFFICE O/P EST MOD 30 MIN: CPT | Performed by: NURSE PRACTITIONER

## 2018-11-13 NOTE — MR AVS SNAPSHOT
After Visit Summary   11/13/2018    Tony Duane Heuer    MRN: [id number]           Patient Information     Date Of Birth          1960        Visit Information        Provider Department      11/13/2018 10:50 AM Nicole Guillen APRN CNP [facility]        Today's Diagnoses     Mitral valve insufficiency, unspecified etiology    -  1    History of CVA (cerebrovascular accident)        PFO (patent foramen ovale)           Follow-ups after your visit        Additional Services     Follow-Up with Cardiologist                 Your next 10 appointments already scheduled     Feb 26, 2019  4:30 PM CST   Remote ICD Check with DELANEY DCR2   [facility] ([facility])    [address] [facility] [address]  [facility] [address] [phone] OPT 2           This appointment is for a remote check of your debrillator.  This is not an appointment at the office.              Future tests that were ordered for you today     Open Future Orders        Priority Expected Expires Ordered    Follow-Up with Cardiologist Routine 5/12/2019 11/13/2019 11/13/2018            Who to contact     If you have questions or need follow up information about today's clinic visit or your schedule please contact [facility] directly at [phone].  Normal or non-critical lab and imaging results will be communicated to you by MyChart, letter or phone within 4 business days after the clinic has received the results. If you do not hear from us within 7 days, please contact the clinic through MyChart or phone. If you have a critical or abnormal lab result, we will notify you by phone as soon as possible.  Submit refill requests through [employer] or call your pharmacy and they will forward the refill request to us. Please allow 3 business days for your refill to be completed.          Additional Information About Your  "Visit        Data Virtualityhart Information     [employer] lets you send messages to your doctor, view your test results, renew your prescriptions, schedule appointments and more. To sign up, go to [email or web address] . Click on \"Log in\" on the left side of the screen, which will take you to the Welcome page. Then click on \"Sign up Now\" on the right side of the page.     You will be asked to enter the access code listed below, as well as some personal information. Please follow the directions to create your username and password.     Your access code is: [email or web address]  Expires: 2019  6:02 AM     Your access code will  in 90 days. If you need help or a new code, please call your [place] clinic or [phone].        Care EveryWhere ID     This is your Care EveryWhere ID. This could be used by other organizations to access your [place] medical records  [id number]        Your Vitals Were     Pulse Height BMI (Body Mass Index)             75 1.854 m (6' 1\") 22.3 kg/m2          Blood Pressure from Last 3 Encounters:   18 120/60   18 117/58   18 122/60    Weight from Last 3 Encounters:   18 76.7 kg (169 lb)   18 76.5 kg (168 lb 9.6 oz)   18 77 kg (169 lb 11.2 oz)              We Performed the Following     Follow-Up with Cardiologist        Primary Care Provider Office Phone # Fax #    Kwaku Castro [phone]       [address] [address]        Equal Access to Services     [facility] AH: Hadii aad ku hadasho Soglynnali, waaxda luqadaha, qaybta kaalmada adeegyada, jovi man. So [facility] [phone].    ATENCIÓN: Si habla español, tiene a damon disposición servicios gratuitos de asistencia lingüística. Llame al [phone].    We comply with applicable federal civil rights laws and [place] laws. We do not discriminate on the basis of race, color, national origin, age, disability, sex, sexual orientation, or " gender identity.            Thank you!     Thank you for choosing [facility]  for your care. Our goal is always to provide you with excellent care. Hearing back from our patients is one way we can continue to improve our services. Please take a few minutes to complete the written survey that you may receive in the mail after your visit with us. Thank you!             Your Updated Medication List - Protect others around you: Learn how to safely use, store and throw away your medicines at www.disposemymeds.org.          This list is accurate as of 11/13/18 11:23 AM.  Always use your most recent med list.                   Brand Name Dispense Instructions for use Diagnosis    aspirin 81 MG tablet      Take 81 mg by mouth daily        LIPITOR 40 MG tablet   Generic drug:  atorvastatin      Take 40 mg by mouth daily        PLAVIX 75 MG tablet   Generic drug:  clopidogrel      Take 75 mg by mouth daily

## 2018-11-13 NOTE — PROGRESS NOTES
HPI:  Dean Duane Heuer is a 58 year old male who is a patient of Dr. Hargrove.  He is a  in [facility].  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  An MRI showed a right occipital infarct and 2 small cerebellar infarcts, mostly suggestive of embolic CVA, there was no disease of his head and neck vessels. The etiology was felt secondary to his PFO.  A PK was performed at [facility] showing an aneurysmal atrial septum with moderate shunting with Valsalva.  In 2012, they did not suggest closure rather put him on Plavix and Lipitor.   Dr. Loja was out of his network of care, therefore was referred to Dr. Hargrove.  He had a zio patch (5/9/2018) which showed no atrial fibrillation and then he proceeded to implanting a loop recorder on 7/2018.  PK (5/9/2018) revealed EF 65%, bileaflet mitral valve prolapse with Mild to moderate mitral regurgitation, moderate eccentric aortic regurgitation,  fossa ovalis portion of the intra-atrial septum appears aneurysmal and no evidence of inter-atrial shunt. Negative bubble study.    There has been no atrial fibrillation on his loop recorder.      Today in clinic he has no concerns.  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 He has had no changes in his exercise tolerance and denies any CV symptoms of CP, SOB/HACKETT, edema.     ASSESSMENT AND PLAN    (Z86.73) History of CVA (cerebrovascular accident)  Continue monitoring for atrial fibrillation with loop recorder    mitral valve prolapse with Mild to moderate mitral regurgitation & moderate eccentric aortic regurgitation,  Will have him follow up with Dr. Hargrove in 6 months with an ECHO prior as he has had a progression in his MR  from his ECHO PK in 4/2012 to his ECHO in 3/2018.  Explained to patient if he has a change in his exercise tolerance to call the clinic       PFO  The original referral from an outside hospital indicated patient had PFT and the PK completed at [facility] indicated no PFO.       Patient expresses understanding and agreement with the plan.     I appreciate the chance to help with Dean Duane Heuer Please let me know if you have any questions or concerns.    DANILO Kan, CNP    This note was completed in part using Dragon voice recognition software. Although reviewed after completion, some word and grammatical errors may occur.    Orders Placed This Encounter   Procedures     Follow-Up with Cardiologist     Echocardiogram     No orders of the defined types were placed in this encounter.    There are no discontinued medications.      Encounter Diagnoses   Name Primary?     History of CVA (cerebrovascular accident)      Mitral valve insufficiency, unspecified etiology Yes     PFO (patent foramen ovale)        CURRENT MEDICATIONS:  Current Outpatient Prescriptions   Medication Sig Dispense Refill     aspirin 81 MG tablet Take 81 mg by mouth daily       atorvastatin (LIPITOR) 40 MG tablet Take 40 mg by mouth daily       clopidogrel (PLAVIX) 75 MG tablet Take 75 mg by mouth daily         ALLERGIES   No Known Allergies    PAST MEDICAL HISTORY:  Past Medical History:   Diagnosis Date     Cerebral infarction (H)      PFO (patent foramen ovale)        PAST SURGICAL HISTORY:  Past Surgical History:   Procedure Laterality Date     H LOOP RECORDER IMPLANT  06/2018     HERNIA REPAIR         FAMILY HISTORY:  Family History   Problem Relation Age of Onset     Diabetes Father      DM Type II       SOCIAL HISTORY:  Social History     Social History     Marital status:      Spouse name: N/A     Number of children: N/A     Years of education: N/A     Social History Main Topics     Smoking status: Never Smoker      "Smokeless tobacco: Never Used     Alcohol use Yes      Comment: 2-3 drinks/week     Drug use: None     Sexual activity: Not Asked     Other Topics Concern     None     Social History Narrative       Review of Systems:  Skin:  Negative     Eyes:  Positive for glasses  ENT:  Negative    Respiratory:  Negative    Cardiovascular:  Negative    Gastroenterology: Negative    Genitourinary:  Negative    Musculoskeletal:  Negative    Neurologic:  Positive for stroke  Psychiatric:  Negative    Heme/Lymph/Imm:  Negative    Endocrine:  Negative      Physical Exam:  Vitals: /60  Pulse 75  Ht 1.854 m (6' 1\")  Wt 76.7 kg (169 lb)  BMI 22.3 kg/m2    Constitutional:  cooperative, alert and oriented, well developed, well nourished, in no acute distress thin      Skin:  warm and dry to the touch, no apparent skin lesions or masses noted        Head:  normocephalic, no masses or lesions        Eyes:  pupils equal and round, conjunctivae and lids unremarkable, sclera white, no xanthalasma, EOMS intact, no nystagmus        ENT:           Neck:  carotid pulses are full and equal bilaterally, JVP normal, no carotid bruit        Chest:           Cardiac: regular rhythm       systolic murmur          Abdomen:  abdomen soft        Vascular: pulses full and equal, no bruits auscultated     right radial artery;2+             left radial artery;2+                  Extremities and Back:  no edema        Neurological:  no gross motor deficits          Recent Lab Results:  LIPID RESULTS:  No results found for: CHOL, HDL, LDL, TRIG, CHOLHDLRATIO    LIVER ENZYME RESULTS:  No results found for: AST, ALT    CBC RESULTS:  Lab Results   Component Value Date    WBC 4.4 06/26/2018    RBC 4.61 06/26/2018    HGB 14.0 06/26/2018    HCT 40.8 06/26/2018    MCV 89 06/26/2018    MCH 30.4 06/26/2018    MCHC 34.3 06/26/2018    RDW 12.4 06/26/2018     06/26/2018       BMP RESULTS:  No results found for: NA, POTASSIUM, CHLORIDE, CO2, ANIONGAP, GLC, " BUN, CR, GFRESTIMATED, GFRESTBLACK, JOSE     A1C RESULTS:  No results found for: A1C    INR RESULTS:  No results found for: INR        CC  Nicole Guillen, APRN CNP  [address]  [place] [id number]

## 2018-11-13 NOTE — LETTER
11/13/2018    Kwaku Castro MD  [address]   [place] [address]    RE: Dean Duane Heuer       Dear Colleague,    I had the pleasure of seeing Dean Duane Heuer in the [facility].    HPI:  Dean Duane Heuer is a 58 year old male who is a patient of Dr. Hargrove.  He is a  in [facility].  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  An MRI showed a right occipital infarct and 2 small cerebellar infarcts, mostly suggestive of embolic CVA, there was no disease of his head and neck vessels. The etiology was felt secondary to his PFO.  A PK was performed at [facility] showing an aneurysmal atrial septum with moderate shunting with Valsalva.  In 2012, they did not suggest closure rather put him on Plavix and Lipitor.   Dr. Loja was out of his network of care, therefore was referred to Dr. Hargrove.  He had a zio patch (5/9/2018) which showed no atrial fibrillation and then he proceeded to implanting a loop recorder on 7/2018.   PK (5/9/2018) revealed EF 65%, bileaflet mitral valve prolapse with Mild to moderate mitral regurgitation, moderate eccentric aortic regurgitation,  fossa ovalis portion of the intra-atrial septum appears aneurysmal and no evidence of inter-atrial shunt. Negative bubble study.    There has been no atrial fibrillation on his loop recorder.      Today in clinic he has no concerns.  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 He has had no changes in his exercise tolerance and denies any CV symptoms of CP, SOB/HACKETT, edema.     ASSESSMENT AND PLAN    (Z86.73) History of CVA (cerebrovascular accident)  Continue monitoring for atrial fibrillation with  loop recorder    mitral valve prolapse with Mild to moderate mitral regurgitation & moderate eccentric aortic regurgitation,  Will have him follow up with Dr. Hargrove in 6 months with an ECHO prior as he has had a progression in his MR from his ECHO PK in 4/2012 to his ECHO in 3/2018.  Explained to patient if he has a change in his exercise tolerance to call the clinic       PFO  The original referral from an outside hospital indicated patient had PFT and the PK completed at [facility] indicated no PFO.       Patient expresses understanding and agreement with the plan.     I appreciate the chance to help with Dean Duane Heuer Please let me know if you have any questions or concerns.    Nicole Guillen, APRN, CNP    This note was completed in part using Dragon voice recognition software. Although reviewed after completion, some word and grammatical errors may occur.    Orders Placed This Encounter   Procedures     Follow-Up with Cardiologist     Echocardiogram     No orders of the defined types were placed in this encounter.    There are no discontinued medications.      Encounter Diagnoses   Name Primary?     History of CVA (cerebrovascular accident)      Mitral valve insufficiency, unspecified etiology Yes     PFO (patent foramen ovale)        CURRENT MEDICATIONS:  Current Outpatient Prescriptions   Medication Sig Dispense Refill     aspirin 81 MG tablet Take 81 mg by mouth daily       atorvastatin (LIPITOR) 40 MG tablet Take 40 mg by mouth daily       clopidogrel (PLAVIX) 75 MG tablet Take 75 mg by mouth daily         ALLERGIES   No Known Allergies    PAST MEDICAL HISTORY:  Past Medical History:   Diagnosis Date     Cerebral infarction (H)      PFO (patent foramen ovale)        PAST SURGICAL HISTORY:  Past Surgical History:   Procedure Laterality Date     H LOOP RECORDER IMPLANT  06/2018     HERNIA REPAIR         FAMILY HISTORY:  Family History   Problem Relation Age of Onset     Diabetes Father      DM Type II  "      SOCIAL HISTORY:  Social History     Social History     Marital status:      Spouse name: N/A     Number of children: N/A     Years of education: N/A     Social History Main Topics     Smoking status: Never Smoker     Smokeless tobacco: Never Used     Alcohol use Yes      Comment: 2-3 drinks/week     Drug use: None     Sexual activity: Not Asked     Other Topics Concern     None     Social History Narrative       Review of Systems:  Skin:  Negative     Eyes:  Positive for glasses  ENT:  Negative    Respiratory:  Negative    Cardiovascular:  Negative    Gastroenterology: Negative    Genitourinary:  Negative    Musculoskeletal:  Negative    Neurologic:  Positive for stroke  Psychiatric:  Negative    Heme/Lymph/Imm:  Negative    Endocrine:  Negative      Physical Exam:  Vitals: /60  Pulse 75  Ht 1.854 m (6' 1\")  Wt 76.7 kg (169 lb)  BMI 22.3 kg/m2    Constitutional:  cooperative, alert and oriented, well developed, well nourished, in no acute distress thin      Skin:  warm and dry to the touch, no apparent skin lesions or masses noted        Head:  normocephalic, no masses or lesions        Eyes:  pupils equal and round, conjunctivae and lids unremarkable, sclera white, no xanthalasma, EOMS intact, no nystagmus        ENT:           Neck:  carotid pulses are full and equal bilaterally, JVP normal, no carotid bruit        Chest:           Cardiac: regular rhythm       systolic murmur          Abdomen:  abdomen soft        Vascular: pulses full and equal, no bruits auscultated     right radial artery;2+             left radial artery;2+                  Extremities and Back:  no edema        Neurological:  no gross motor deficits          Recent Lab Results:  LIPID RESULTS:  No results found for: CHOL, HDL, LDL, TRIG, CHOLHDLRATIO    LIVER ENZYME RESULTS:  No results found for: AST, ALT    CBC RESULTS:  Lab Results   Component Value Date    WBC 4.4 06/26/2018    RBC 4.61 06/26/2018    HGB 14.0 " 06/26/2018    HCT 40.8 06/26/2018    MCV 89 06/26/2018    MCH 30.4 06/26/2018    MCHC 34.3 06/26/2018    RDW 12.4 06/26/2018     06/26/2018       BMP RESULTS:  No results found for: NA, POTASSIUM, CHLORIDE, CO2, ANIONGAP, GLC, BUN, CR, GFRESTIMATED, GFRESTBLACK, JOSE     A1C RESULTS:  No results found for: A1C    INR RESULTS:  No results found for: INR        CC  DANILO Milian CNP  [address]  [place] [address]                  Thank you for allowing me to participate in the care of your patient.      Sincerely,     DANILO Ling CNP     [facility]

## 2019-02-26 ENCOUNTER — ANCILLARY PROCEDURE (OUTPATIENT)
Dept: CARDIOLOGY | Facility: CLINIC | Age: 59
End: 2019-02-26
Attending: INTERNAL MEDICINE
Payer: COMMERCIAL

## 2019-02-26 DIAGNOSIS — Z95.0 PACEMAKER: ICD-10-CM

## 2019-02-26 PROCEDURE — 93294 REM INTERROG EVL PM/LDLS PM: CPT | Performed by: INTERNAL MEDICINE

## 2019-04-16 ENCOUNTER — TELEPHONE (OUTPATIENT)
Dept: CARDIOLOGY | Facility: CLINIC | Age: 59
End: 2019-04-16

## 2019-04-16 NOTE — TELEPHONE ENCOUNTER
Remote loop recorder alert for on pause on 4/15 at 17:50. It lasted 3 sec. It is preceded by artifact and a 'noisy' signal. It appears to be loss of QRS sensing. Called and pt did not have symptoms at that time. Romeo

## 2019-06-03 ENCOUNTER — ANCILLARY PROCEDURE (OUTPATIENT)
Dept: CARDIOLOGY | Facility: CLINIC | Age: 59
End: 2019-06-03
Attending: INTERNAL MEDICINE
Payer: COMMERCIAL

## 2019-06-03 DIAGNOSIS — Z95.0 PACEMAKER: ICD-10-CM

## 2019-06-03 DIAGNOSIS — Z45.09 ENCOUNTER FOR LOOP RECORDER CHECK: Primary | ICD-10-CM

## 2019-06-03 PROCEDURE — 93298 REM INTERROG DEV EVAL SCRMS: CPT | Performed by: INTERNAL MEDICINE

## 2019-06-03 PROCEDURE — 93299 CARDIAC DEVICE CHECK - REMOTE: CPT | Performed by: INTERNAL MEDICINE

## 2019-06-09 LAB
MDC_IDC_EPISODE_DTM: NORMAL
MDC_IDC_EPISODE_DURATION: 4.64 S
MDC_IDC_EPISODE_ID: 30
MDC_IDC_EPISODE_TYPE: NORMAL
MDC_IDC_MSMT_BATTERY_STATUS: NORMAL
MDC_IDC_PG_IMPLANT_DTM: NORMAL
MDC_IDC_PG_MFG: NORMAL
MDC_IDC_PG_MODEL: NORMAL
MDC_IDC_PG_SERIAL: NORMAL
MDC_IDC_PG_TYPE: NORMAL
MDC_IDC_SESS_CLINIC_NAME: NORMAL
MDC_IDC_SESS_DTM: NORMAL
MDC_IDC_SESS_TYPE: NORMAL
MDC_IDC_SET_ZONE_DETECTION_INTERVAL: 2000 MS
MDC_IDC_SET_ZONE_DETECTION_INTERVAL: 3000 MS
MDC_IDC_SET_ZONE_DETECTION_INTERVAL: 340 MS
MDC_IDC_SET_ZONE_TYPE: NORMAL
MDC_IDC_STAT_AT_BURDEN_PERCENT: 0 %
MDC_IDC_STAT_AT_DTM_END: NORMAL
MDC_IDC_STAT_AT_DTM_START: NORMAL
MDC_IDC_STAT_EPISODE_RECENT_COUNT: 0
MDC_IDC_STAT_EPISODE_RECENT_COUNT: 1
MDC_IDC_STAT_EPISODE_RECENT_COUNT_DTM_END: NORMAL
MDC_IDC_STAT_EPISODE_RECENT_COUNT_DTM_START: NORMAL
MDC_IDC_STAT_EPISODE_TOTAL_COUNT: 0
MDC_IDC_STAT_EPISODE_TOTAL_COUNT: 1
MDC_IDC_STAT_EPISODE_TOTAL_COUNT: 2
MDC_IDC_STAT_EPISODE_TOTAL_COUNT: 27
MDC_IDC_STAT_EPISODE_TOTAL_COUNT_DTM_END: NORMAL
MDC_IDC_STAT_EPISODE_TOTAL_COUNT_DTM_START: NORMAL
MDC_IDC_STAT_EPISODE_TYPE: NORMAL

## 2019-07-08 ENCOUNTER — TELEPHONE (OUTPATIENT)
Dept: CARDIOLOGY | Facility: CLINIC | Age: 59
End: 2019-07-08

## 2019-07-08 NOTE — TELEPHONE ENCOUNTER
Pt called- left voice message that he has ankle swelling.  Called him back - he saw PCP who felt it was related to multiple insect bites on his legs.  Pt denies shortness of breath, no chest discomfort.  Call back prn if shortness of breath occurs.  Pt understands and agrees.

## 2019-09-03 ENCOUNTER — HOSPITAL ENCOUNTER (OUTPATIENT)
Dept: CARDIOLOGY | Facility: CLINIC | Age: 59
Discharge: HOME | End: 2019-09-03
Attending: NURSE PRACTITIONER | Admitting: NURSE PRACTITIONER
Payer: COMMERCIAL

## 2019-09-03 ENCOUNTER — OFFICE VISIT (OUTPATIENT)
Dept: CARDIOLOGY | Facility: CLINIC | Age: 59
End: 2019-09-03
Payer: COMMERCIAL

## 2019-09-03 VITALS
DIASTOLIC BLOOD PRESSURE: 71 MMHG | HEART RATE: 64 BPM | WEIGHT: 167 LBS | SYSTOLIC BLOOD PRESSURE: 133 MMHG | BODY MASS INDEX: 22.13 KG/M2 | HEIGHT: 73 IN

## 2019-09-03 DIAGNOSIS — Z86.73 HISTORY OF CVA (CEREBROVASCULAR ACCIDENT): ICD-10-CM

## 2019-09-03 DIAGNOSIS — I34.0 MITRAL VALVE INSUFFICIENCY, UNSPECIFIED ETIOLOGY: ICD-10-CM

## 2019-09-03 DIAGNOSIS — Q21.12 PFO (PATENT FORAMEN OVALE): ICD-10-CM

## 2019-09-03 PROCEDURE — 99214 OFFICE O/P EST MOD 30 MIN: CPT | Performed by: INTERNAL MEDICINE

## 2019-09-03 PROCEDURE — 93306 TTE W/DOPPLER COMPLETE: CPT

## 2019-09-03 PROCEDURE — 93306 TTE W/DOPPLER COMPLETE: CPT | Mod: 26 | Performed by: INTERNAL MEDICINE

## 2019-09-03 ASSESSMENT — MIFFLIN-ST. JEOR: SCORE: 1636.39

## 2019-09-03 NOTE — LETTER
9/3/2019      Kwaku Castro MD  [address]  [place] [address]      RE: Dean Duane Heuer       Dear Colleague,    I had the pleasure of seeing Dean Duane Heuer in the [facility].    Service Date: 09/03/2019      HISTORY OF PRESENT ILLNESS:  Mr. Wilson is a very pleasant 59-year-old  who is following up with me here.  I initially saw him back in 04/2018.  He at that time gave a history of a stroke approximately 6 years before that, so he has been treated with aspirin and statins over the past 7-8 years and has had no recurrence.  At that time, [facility]  A transesophageal echocardiogram showed an aneurysmal septum with a positive bubble study.  Then, after seeing me in 04/2018 we had him undergo a transesophageal echocardiogram which was performed by Dr. Hoff which showed an interatrial septal aneurysm; however, bubble study was performed which was negative for flow across the atrial septum.  He has been maintained on Plavix and statin since then and has had no recurrence.  We also did a ZIO Patch monitor which was negative for any atrial fibrillation.  His echocardiogram here in followup shows mitral valve prolapse, bileaflet, with moderate 2+ mitral regurgitation and 2+ aortic insufficiency.  His LV function and size are normal.      ASSESSMENT AND PLAN:  It is a pleasure seeing Mr. Wilson back in followup appearing stable and asymptomatic from a cardiovascular standpoint.  He has moderate aortic and mitral regurgitation with preserved LA size, LV size and function and normal pulmonary pressures.  We will continue to monitor and watch in followup.  Regarding his questionable PFO, transesophageal echocardiogram has shown no shunt.  On top of this, he has had no events in 7-8 years on aspirin and statins.  At this point in time, I would lean towards conservative management.  If there becomes a question, we could  perform an intracranial Doppler to look for evidence of shunt, but again clinically perhaps this is best treated conservatively unless he should have recurrent event.         YOKO WARD MD [facility]             D: 2019   T: 2019   MT: RUDI      Name:     KAL SHARP   MRN:      [id number]        Account:      [id number]   :      1960           Service Date: 2019      Document: [id number]         Outpatient Encounter Medications as of 9/3/2019   Medication Sig Dispense Refill     aspirin 81 MG tablet Take 81 mg by mouth daily       atorvastatin (LIPITOR) 40 MG tablet Take 40 mg by mouth daily       clopidogrel (PLAVIX) 75 MG tablet Take 75 mg by mouth daily       No facility-administered encounter medications on file as of 9/3/2019.        Again, thank you for allowing me to participate in the care of your patient.      Sincerely,    YOKO WARD MD     [facility]

## 2019-09-03 NOTE — LETTER
9/3/2019    Kwaku Castro MD  [address]  [place] [address]    RE: Dean Duane Heuer       Dear Colleague,    I had the pleasure of seeing Dean Duane Heuer in the [facility].    HPI and Plan:   See dictation    Orders Placed This Encounter   Procedures     Follow-Up with Cardiologist     Echocardiogram Complete     No orders of the defined types were placed in this encounter.    There are no discontinued medications.      Encounter Diagnoses   Name Primary?     History of CVA (cerebrovascular accident)      PFO (patent foramen ovale)      Mitral valve insufficiency, unspecified etiology        CURRENT MEDICATIONS:  Current Outpatient Medications   Medication Sig Dispense Refill     aspirin 81 MG tablet Take 81 mg by mouth daily       atorvastatin (LIPITOR) 40 MG tablet Take 40 mg by mouth daily       clopidogrel (PLAVIX) 75 MG tablet Take 75 mg by mouth daily         ALLERGIES   No Known Allergies    PAST MEDICAL HISTORY:  Past Medical History:   Diagnosis Date     Cerebral infarction (H)      PFO (patent foramen ovale)        PAST SURGICAL HISTORY:  Past Surgical History:   Procedure Laterality Date     H LOOP RECORDER IMPLANT  06/2018     HERNIA REPAIR         FAMILY HISTORY:  Family History   Problem Relation Age of Onset     Diabetes Father         DM Type II       SOCIAL HISTORY:  Social History     Socioeconomic History     Marital status:      Spouse name: None     Number of children: None     Years of education: None     Highest education level: None   Occupational History     None   Social Needs     Financial resource strain: None     Food insecurity:     Worry: None     Inability: None     Transportation needs:     Medical: None     Non-medical: None   Tobacco Use     Smoking status: Never Smoker     Smokeless tobacco: Never Used   Substance and Sexual Activity     Alcohol use: Yes     Comment: 2-3 drinks/week     Drug use: None     Sexual  "activity: None   Lifestyle     Physical activity:     Days per week: None     Minutes per session: None     Stress: None   Relationships     Social connections:     Talks on phone: None     Gets together: None     Attends [employer] service: None     Active member of club or organization: None     Attends meetings of clubs or organizations: None     Relationship status: None     Intimate partner violence:     Fear of current or ex partner: None     Emotionally abused: None     Physically abused: None     Forced sexual activity: None   Other Topics Concern     Parent/sibling w/ CABG, MI or angioplasty before 65F 55M? Not Asked   Social History Narrative     None       Review of Systems:  Skin:  Negative     Eyes:  Positive for glasses  ENT:  Negative    Respiratory:  Negative    Cardiovascular:  Negative    Gastroenterology: Negative    Genitourinary:  Negative    Musculoskeletal:  Negative    Neurologic:  Positive for stroke  Psychiatric:  Negative    Heme/Lymph/Imm:  Negative    Endocrine:  Negative      Physical Exam:  Vitals: /71   Pulse 64   Ht 1.854 m (6' 1\")   Wt 75.8 kg (167 lb)   BMI 22.03 kg/m       Constitutional:           Skin:             Head:           Eyes:           Lymph:      ENT:           Neck:           Respiratory:            Cardiac:                                                           GI:           Extremities and Muscular Skeletal:                 Neurological:           Psych:           Recent Lab Results:  LIPID RESULTS:  No results found for: CHOL, HDL, LDL, TRIG, CHOLHDLRATIO    LIVER ENZYME RESULTS:  No results found for: AST, ALT    CBC RESULTS:  Lab Results   Component Value Date    WBC 4.4 06/26/2018    RBC 4.61 06/26/2018    HGB 14.0 06/26/2018    HCT 40.8 06/26/2018    MCV 89 06/26/2018    MCH 30.4 06/26/2018    MCHC 34.3 06/26/2018    RDW 12.4 06/26/2018     06/26/2018       BMP RESULTS:  No results found for: NA, POTASSIUM, CHLORIDE, CO2, ANIONGAP, GLC, BUN, " CR, GFRESTIMATED, GFRESTBLACK, JOSE     A1C RESULTS:  No results found for: A1C    INR RESULTS:  No results found for: INR        CC  [place] [address]                  Thank you for allowing me to participate in the care of your patient.      Sincerely,     YOKO WARD MD     [facility]    cc:   Kwaku Castro MD  [address] [address]

## 2019-09-03 NOTE — PROGRESS NOTES
HPI and Plan:   See dictation    Orders Placed This Encounter   Procedures     Follow-Up with Cardiologist     Echocardiogram Complete     No orders of the defined types were placed in this encounter.    There are no discontinued medications.      Encounter Diagnoses   Name Primary?     History of CVA (cerebrovascular accident)      PFO (patent foramen ovale)      Mitral valve insufficiency, unspecified etiology        CURRENT MEDICATIONS:  Current Outpatient Medications   Medication Sig Dispense Refill     aspirin 81 MG tablet Take 81 mg by mouth daily       atorvastatin (LIPITOR) 40 MG tablet Take 40 mg by mouth daily       clopidogrel (PLAVIX) 75 MG tablet Take 75 mg by mouth daily         ALLERGIES   No Known Allergies    PAST MEDICAL HISTORY:  Past Medical History:   Diagnosis Date     Cerebral infarction (H)      PFO (patent foramen ovale)        PAST SURGICAL HISTORY:  Past Surgical History:   Procedure Laterality Date     H LOOP RECORDER IMPLANT  06/2018     HERNIA REPAIR         FAMILY HISTORY:  Family History   Problem Relation Age of Onset     Diabetes Father         DM Type II       SOCIAL HISTORY:  Social History     Socioeconomic History     Marital status:      Spouse name: None     Number of children: None     Years of education: None     Highest education level: None   Occupational History     None   Social Needs     Financial resource strain: None     Food insecurity:     Worry: None     Inability: None     Transportation needs:     Medical: None     Non-medical: None   Tobacco Use     Smoking status: Never Smoker     Smokeless tobacco: Never Used   Substance and Sexual Activity     Alcohol use: Yes     Comment: 2-3 drinks/week     Drug use: None     Sexual activity: None   Lifestyle     Physical activity:     Days per week: None     Minutes per session: None     Stress: None   Relationships     Social connections:     Talks on phone: None     Gets together: None     Attends Latter day  "service: None     Active member of club or organization: None     Attends meetings of clubs or organizations: None     Relationship status: None     Intimate partner violence:     Fear of current or ex partner: None     Emotionally abused: None     Physically abused: None     Forced sexual activity: None   Other Topics Concern     Parent/sibling w/ CABG, MI or angioplasty before 65F 55M? Not Asked   Social History Narrative     None       Review of Systems:  Skin:  Negative     Eyes:  Positive for glasses  ENT:  Negative    Respiratory:  Negative    Cardiovascular:  Negative    Gastroenterology: Negative    Genitourinary:  Negative    Musculoskeletal:  Negative    Neurologic:  Positive for stroke  Psychiatric:  Negative    Heme/Lymph/Imm:  Negative    Endocrine:  Negative      Physical Exam:  Vitals: /71   Pulse 64   Ht 1.854 m (6' 1\")   Wt 75.8 kg (167 lb)   BMI 22.03 kg/m      Constitutional:           Skin:             Head:           Eyes:           Lymph:      ENT:           Neck:           Respiratory:            Cardiac:                                                           GI:           Extremities and Muscular Skeletal:                 Neurological:           Psych:           Recent Lab Results:  LIPID RESULTS:  No results found for: CHOL, HDL, LDL, TRIG, CHOLHDLRATIO    LIVER ENZYME RESULTS:  No results found for: AST, ALT    CBC RESULTS:  Lab Results   Component Value Date    WBC 4.4 06/26/2018    RBC 4.61 06/26/2018    HGB 14.0 06/26/2018    HCT 40.8 06/26/2018    MCV 89 06/26/2018    MCH 30.4 06/26/2018    MCHC 34.3 06/26/2018    RDW 12.4 06/26/2018     06/26/2018       BMP RESULTS:  No results found for: NA, POTASSIUM, CHLORIDE, CO2, ANIONGAP, GLC, BUN, CR, GFRESTIMATED, GFRESTBLACK, JOSE     A1C RESULTS:  No results found for: A1C    INR RESULTS:  No results found for: INR        CC  Kwaku Castro MD  [address]  [place] [id number]                "

## 2019-09-03 NOTE — PROGRESS NOTES
Service Date: 09/03/2019      HISTORY OF PRESENT ILLNESS:  Mr. Wilson is a very pleasant 59-year-old  who is following up with me here.  I initially saw him back in 04/2018.  He at that time gave a history of a stroke approximately 6 years before that, so he has been treated with aspirin and statins over the past 7-8 years and has had no recurrence.  At that time, apparently he was seen by an [facility] cardiologist.  A transesophageal echocardiogram showed an aneurysmal septum with a positive bubble study.  Then, after seeing me in 04/2018 we had him undergo a transesophageal echocardiogram which was performed by Dr. Hoff which showed an interatrial septal aneurysm; however, bubble study was performed which was negative for flow across the atrial septum.  He has been maintained on Plavix and statin since then and has had no recurrence.  We also did a ZIO Patch monitor which was negative for any atrial fibrillation.  His echocardiogram here in followup shows mitral valve prolapse, bileaflet, with moderate 2+ mitral regurgitation and 2+ aortic insufficiency.  His LV function and size are normal.      ASSESSMENT AND PLAN:  It is a pleasure seeing Mr. Wilson back in followup appearing stable and asymptomatic from a cardiovascular standpoint.  He has moderate aortic and mitral regurgitation with preserved LA size, LV size and function and normal pulmonary pressures.  We will continue to monitor and watch in followup.  Regarding his questionable PFO, transesophageal echocardiogram has shown no shunt.  On top of this, he has had no events in 7-8 years on aspirin and statins.  At this point in time, I would lean towards conservative management.  If there becomes a question, we could perform an intracranial Doppler to look for evidence of shunt, but again clinically perhaps this is best treated conservatively unless he should have recurrent event.         YOKO WARD MD [facility]             D: 09/03/2019   T:  2019   MT: RUDI      Name:     KAL SHARP   MRN:      [id number]        Account:      [id number]   :      1960           Service Date: 2019      Document: [id number]

## 2019-09-16 ENCOUNTER — ANCILLARY PROCEDURE (OUTPATIENT)
Dept: CARDIOLOGY | Facility: CLINIC | Age: 59
End: 2019-09-16
Attending: INTERNAL MEDICINE
Payer: COMMERCIAL

## 2019-09-16 DIAGNOSIS — Z45.09 ENCOUNTER FOR LOOP RECORDER CHECK: ICD-10-CM

## 2019-09-16 PROCEDURE — 93298 REM INTERROG DEV EVAL SCRMS: CPT | Performed by: INTERNAL MEDICINE

## 2019-09-16 PROCEDURE — 93299 CARDIAC DEVICE CHECK - REMOTE: CPT | Performed by: INTERNAL MEDICINE

## 2019-10-01 LAB
MDC_IDC_MSMT_BATTERY_STATUS: NORMAL
MDC_IDC_MSMT_BATTERY_STATUS: NORMAL
MDC_IDC_PG_IMPLANT_DTM: NORMAL
MDC_IDC_PG_IMPLANT_DTM: NORMAL
MDC_IDC_PG_MFG: NORMAL
MDC_IDC_PG_MFG: NORMAL
MDC_IDC_PG_MODEL: NORMAL
MDC_IDC_PG_MODEL: NORMAL
MDC_IDC_PG_SERIAL: NORMAL
MDC_IDC_PG_SERIAL: NORMAL
MDC_IDC_PG_TYPE: NORMAL
MDC_IDC_PG_TYPE: NORMAL
MDC_IDC_SESS_CLINIC_NAME: NORMAL
MDC_IDC_SESS_CLINIC_NAME: NORMAL
MDC_IDC_SESS_DTM: NORMAL
MDC_IDC_SESS_DTM: NORMAL
MDC_IDC_SESS_TYPE: NORMAL
MDC_IDC_SESS_TYPE: NORMAL
MDC_IDC_SET_ZONE_DETECTION_INTERVAL: 2000 MS
MDC_IDC_SET_ZONE_DETECTION_INTERVAL: 2000 MS
MDC_IDC_SET_ZONE_DETECTION_INTERVAL: 3000 MS
MDC_IDC_SET_ZONE_DETECTION_INTERVAL: 3000 MS
MDC_IDC_SET_ZONE_DETECTION_INTERVAL: 340 MS
MDC_IDC_SET_ZONE_DETECTION_INTERVAL: 340 MS
MDC_IDC_SET_ZONE_TYPE: NORMAL
MDC_IDC_STAT_AT_BURDEN_PERCENT: 0 %
MDC_IDC_STAT_AT_BURDEN_PERCENT: 0 %
MDC_IDC_STAT_AT_DTM_END: NORMAL
MDC_IDC_STAT_AT_DTM_END: NORMAL
MDC_IDC_STAT_AT_DTM_START: NORMAL
MDC_IDC_STAT_AT_DTM_START: NORMAL
MDC_IDC_STAT_EPISODE_RECENT_COUNT: 0
MDC_IDC_STAT_EPISODE_RECENT_COUNT: 1
MDC_IDC_STAT_EPISODE_RECENT_COUNT: 10
MDC_IDC_STAT_EPISODE_RECENT_COUNT: 4
MDC_IDC_STAT_EPISODE_RECENT_COUNT_DTM_END: NORMAL
MDC_IDC_STAT_EPISODE_RECENT_COUNT_DTM_START: NORMAL
MDC_IDC_STAT_EPISODE_TOTAL_COUNT: 0
MDC_IDC_STAT_EPISODE_TOTAL_COUNT: 1
MDC_IDC_STAT_EPISODE_TOTAL_COUNT: 16
MDC_IDC_STAT_EPISODE_TOTAL_COUNT: 2
MDC_IDC_STAT_EPISODE_TOTAL_COUNT: 31
MDC_IDC_STAT_EPISODE_TOTAL_COUNT_DTM_END: NORMAL
MDC_IDC_STAT_EPISODE_TOTAL_COUNT_DTM_START: NORMAL
MDC_IDC_STAT_EPISODE_TYPE: NORMAL

## 2019-12-16 ENCOUNTER — ANCILLARY PROCEDURE (OUTPATIENT)
Dept: CARDIOLOGY | Facility: CLINIC | Age: 59
End: 2019-12-16
Attending: INTERNAL MEDICINE
Payer: COMMERCIAL

## 2019-12-16 DIAGNOSIS — Z45.09 ENCOUNTER FOR LOOP RECORDER CHECK: ICD-10-CM

## 2019-12-16 PROCEDURE — 93298 REM INTERROG DEV EVAL SCRMS: CPT | Performed by: INTERNAL MEDICINE

## 2019-12-16 PROCEDURE — 93299 CARDIAC DEVICE CHECK - REMOTE: CPT | Performed by: INTERNAL MEDICINE

## 2019-12-26 LAB
MDC_IDC_MSMT_BATTERY_STATUS: NORMAL
MDC_IDC_PG_IMPLANT_DTM: NORMAL
MDC_IDC_PG_MFG: NORMAL
MDC_IDC_PG_MODEL: NORMAL
MDC_IDC_PG_SERIAL: NORMAL
MDC_IDC_PG_TYPE: NORMAL
MDC_IDC_SESS_CLINIC_NAME: NORMAL
MDC_IDC_SESS_DTM: NORMAL
MDC_IDC_SESS_TYPE: NORMAL
MDC_IDC_SET_ZONE_DETECTION_INTERVAL: 2000 MS
MDC_IDC_SET_ZONE_DETECTION_INTERVAL: 3000 MS
MDC_IDC_SET_ZONE_DETECTION_INTERVAL: 340 MS
MDC_IDC_SET_ZONE_TYPE: NORMAL
MDC_IDC_STAT_AT_BURDEN_PERCENT: 0 %
MDC_IDC_STAT_AT_DTM_END: NORMAL
MDC_IDC_STAT_AT_DTM_START: NORMAL
MDC_IDC_STAT_EPISODE_RECENT_COUNT: 0
MDC_IDC_STAT_EPISODE_RECENT_COUNT: 4
MDC_IDC_STAT_EPISODE_RECENT_COUNT: 6
MDC_IDC_STAT_EPISODE_RECENT_COUNT_DTM_END: NORMAL
MDC_IDC_STAT_EPISODE_RECENT_COUNT_DTM_START: NORMAL
MDC_IDC_STAT_EPISODE_TOTAL_COUNT: 0
MDC_IDC_STAT_EPISODE_TOTAL_COUNT: 0
MDC_IDC_STAT_EPISODE_TOTAL_COUNT: 1
MDC_IDC_STAT_EPISODE_TOTAL_COUNT: 2
MDC_IDC_STAT_EPISODE_TOTAL_COUNT: 35
MDC_IDC_STAT_EPISODE_TOTAL_COUNT: 7
MDC_IDC_STAT_EPISODE_TOTAL_COUNT_DTM_END: NORMAL
MDC_IDC_STAT_EPISODE_TOTAL_COUNT_DTM_START: NORMAL
MDC_IDC_STAT_EPISODE_TYPE: NORMAL

## 2020-03-09 ENCOUNTER — TRANSFERRED RECORDS (OUTPATIENT)
Dept: HEALTH INFORMATION MANAGEMENT | Facility: CLINIC | Age: 60
End: 2020-03-09

## 2020-03-18 ENCOUNTER — ANCILLARY PROCEDURE (OUTPATIENT)
Dept: CARDIOLOGY | Facility: CLINIC | Age: 60
End: 2020-03-18
Attending: INTERNAL MEDICINE
Payer: COMMERCIAL

## 2020-03-18 DIAGNOSIS — Z45.09 ENCOUNTER FOR LOOP RECORDER CHECK: ICD-10-CM

## 2020-03-18 PROCEDURE — 93298 REM INTERROG DEV EVAL SCRMS: CPT | Mod: 25 | Performed by: INTERNAL MEDICINE

## 2020-03-18 PROCEDURE — G2066 INTER DEVC REMOTE 30D: HCPCS | Performed by: INTERNAL MEDICINE

## 2020-03-19 DIAGNOSIS — I63.9 CVA (CEREBRAL VASCULAR ACCIDENT) (H): Primary | ICD-10-CM

## 2020-03-19 DIAGNOSIS — Z95.818 STATUS POST PLACEMENT OF IMPLANTABLE LOOP RECORDER: ICD-10-CM

## 2020-04-03 LAB
MDC_IDC_EPISODE_DTM: NORMAL
MDC_IDC_EPISODE_DURATION: 6.66 S
MDC_IDC_EPISODE_ID: 49
MDC_IDC_EPISODE_TYPE: NORMAL
MDC_IDC_MSMT_BATTERY_STATUS: NORMAL
MDC_IDC_PG_IMPLANT_DTM: NORMAL
MDC_IDC_PG_MFG: NORMAL
MDC_IDC_PG_MODEL: NORMAL
MDC_IDC_PG_SERIAL: NORMAL
MDC_IDC_PG_TYPE: NORMAL
MDC_IDC_SESS_CLINIC_NAME: NORMAL
MDC_IDC_SESS_DTM: NORMAL
MDC_IDC_SESS_TYPE: NORMAL
MDC_IDC_SET_ZONE_TYPE: NORMAL
MDC_IDC_STAT_AT_BURDEN_PERCENT: 0 %
MDC_IDC_STAT_AT_DTM_END: NORMAL
MDC_IDC_STAT_AT_DTM_START: NORMAL
MDC_IDC_STAT_EPISODE_RECENT_COUNT: 0
MDC_IDC_STAT_EPISODE_RECENT_COUNT: 1
MDC_IDC_STAT_EPISODE_RECENT_COUNT_DTM_END: NORMAL
MDC_IDC_STAT_EPISODE_RECENT_COUNT_DTM_START: NORMAL
MDC_IDC_STAT_EPISODE_TOTAL_COUNT: 0
MDC_IDC_STAT_EPISODE_TOTAL_COUNT: 0
MDC_IDC_STAT_EPISODE_TOTAL_COUNT: 1
MDC_IDC_STAT_EPISODE_TOTAL_COUNT: 2
MDC_IDC_STAT_EPISODE_TOTAL_COUNT: 39
MDC_IDC_STAT_EPISODE_TOTAL_COUNT: 7
MDC_IDC_STAT_EPISODE_TOTAL_COUNT_DTM_END: NORMAL
MDC_IDC_STAT_EPISODE_TOTAL_COUNT_DTM_START: NORMAL
MDC_IDC_STAT_EPISODE_TYPE: NORMAL

## 2020-05-08 ENCOUNTER — TELEPHONE (OUTPATIENT)
Dept: CARDIOLOGY | Facility: CLINIC | Age: 60
End: 2020-05-08

## 2020-05-08 NOTE — TELEPHONE ENCOUNTER
[employer] Reveal LINQ alert for a pause on 5/7/20 at 0103.  EGMs shows 'noise' followed by pause vs. undersensing of QRS. There have been similar episodes in the past that appear to be loss of QRS sensing. Loop recorder was implanted for crytogenic stroke and no atrial arrhythmias have been recorded.      Pt stated he was sleeping at this time and cannot recall any symptoms.  Pt had questions about when he should be concerned with these potential pauses.  Explained that many of the alerts we have received have been from undersensing of his heart rhythm and it is difficult to say if he has had any actual pauses with this undersensing/noise that occurs, so we would be concerned if he had any symptoms that correlated to the episodes.  Pt was instructed to use symptom trigger if he notices any severe lightheadedness, dizziness, or feeling like he is about to pass out.     Pt verbalized understanding and will call the clinic with any questions or concerns.  Will cont to monitor.     ДМИТРИЙ Olivia

## 2020-06-18 ENCOUNTER — ANCILLARY PROCEDURE (OUTPATIENT)
Dept: CARDIOLOGY | Facility: CLINIC | Age: 60
End: 2020-06-18
Attending: INTERNAL MEDICINE
Payer: COMMERCIAL

## 2020-06-18 DIAGNOSIS — I63.9 CVA (CEREBRAL VASCULAR ACCIDENT) (H): ICD-10-CM

## 2020-06-18 DIAGNOSIS — Z95.818 STATUS POST PLACEMENT OF IMPLANTABLE LOOP RECORDER: ICD-10-CM

## 2020-06-18 PROCEDURE — G2066 INTER DEVC REMOTE 30D: HCPCS | Performed by: INTERNAL MEDICINE

## 2020-06-18 PROCEDURE — 93298 REM INTERROG DEV EVAL SCRMS: CPT | Performed by: INTERNAL MEDICINE

## 2020-06-19 DIAGNOSIS — Z95.0 CARDIAC PACEMAKER IN SITU: ICD-10-CM

## 2020-06-19 DIAGNOSIS — Z45.09 ENCOUNTER FOR LOOP RECORDER CHECK: ICD-10-CM

## 2020-06-19 DIAGNOSIS — I63.9 CVA (CEREBRAL VASCULAR ACCIDENT) (H): Primary | ICD-10-CM

## 2020-06-22 LAB
MDC_IDC_MSMT_BATTERY_STATUS: NORMAL
MDC_IDC_PG_IMPLANT_DTM: NORMAL
MDC_IDC_PG_MFG: NORMAL
MDC_IDC_PG_MODEL: NORMAL
MDC_IDC_PG_SERIAL: NORMAL
MDC_IDC_PG_TYPE: NORMAL
MDC_IDC_SESS_CLINIC_NAME: NORMAL
MDC_IDC_SESS_DTM: NORMAL
MDC_IDC_SESS_TYPE: NORMAL
MDC_IDC_SET_ZONE_DETECTION_INTERVAL: 2000 MS
MDC_IDC_SET_ZONE_DETECTION_INTERVAL: 3000 MS
MDC_IDC_SET_ZONE_DETECTION_INTERVAL: 340 MS
MDC_IDC_SET_ZONE_TYPE: NORMAL
MDC_IDC_STAT_AT_BURDEN_PERCENT: 0 %
MDC_IDC_STAT_AT_DTM_END: NORMAL
MDC_IDC_STAT_AT_DTM_START: NORMAL
MDC_IDC_STAT_EPISODE_RECENT_COUNT: 0
MDC_IDC_STAT_EPISODE_RECENT_COUNT: 11
MDC_IDC_STAT_EPISODE_RECENT_COUNT_DTM_END: NORMAL
MDC_IDC_STAT_EPISODE_RECENT_COUNT_DTM_START: NORMAL
MDC_IDC_STAT_EPISODE_TOTAL_COUNT: 0
MDC_IDC_STAT_EPISODE_TOTAL_COUNT: 0
MDC_IDC_STAT_EPISODE_TOTAL_COUNT: 1
MDC_IDC_STAT_EPISODE_TOTAL_COUNT: 2
MDC_IDC_STAT_EPISODE_TOTAL_COUNT: 50
MDC_IDC_STAT_EPISODE_TOTAL_COUNT: 7
MDC_IDC_STAT_EPISODE_TOTAL_COUNT_DTM_END: NORMAL
MDC_IDC_STAT_EPISODE_TOTAL_COUNT_DTM_START: NORMAL
MDC_IDC_STAT_EPISODE_TYPE: NORMAL

## 2020-09-21 ENCOUNTER — ANCILLARY PROCEDURE (OUTPATIENT)
Dept: CARDIOLOGY | Facility: CLINIC | Age: 60
End: 2020-09-21
Attending: INTERNAL MEDICINE
Payer: COMMERCIAL

## 2020-09-21 DIAGNOSIS — I63.9 CVA (CEREBRAL VASCULAR ACCIDENT) (H): ICD-10-CM

## 2020-09-21 DIAGNOSIS — Z45.09 ENCOUNTER FOR LOOP RECORDER CHECK: ICD-10-CM

## 2020-09-21 PROCEDURE — 93297 REM INTERROG DEV EVAL ICPMS: CPT | Performed by: INTERNAL MEDICINE

## 2020-09-21 PROCEDURE — G2066 INTER DEVC REMOTE 30D: HCPCS | Performed by: INTERNAL MEDICINE

## 2020-09-22 DIAGNOSIS — I63.9 CVA (CEREBRAL VASCULAR ACCIDENT) (H): Primary | ICD-10-CM

## 2020-09-22 DIAGNOSIS — Z45.09 ENCOUNTER FOR LOOP RECORDER CHECK: ICD-10-CM

## 2020-10-01 LAB
MDC_IDC_EPISODE_DTM: NORMAL
MDC_IDC_EPISODE_DURATION: 3.39 S
MDC_IDC_EPISODE_ID: 68
MDC_IDC_EPISODE_TYPE: NORMAL
MDC_IDC_MSMT_BATTERY_STATUS: NORMAL
MDC_IDC_PG_IMPLANT_DTM: NORMAL
MDC_IDC_PG_MFG: NORMAL
MDC_IDC_PG_MODEL: NORMAL
MDC_IDC_PG_SERIAL: NORMAL
MDC_IDC_PG_TYPE: NORMAL
MDC_IDC_SESS_CLINIC_NAME: NORMAL
MDC_IDC_SESS_DTM: NORMAL
MDC_IDC_SESS_TYPE: NORMAL
MDC_IDC_SET_ZONE_DETECTION_INTERVAL: 2000 MS
MDC_IDC_SET_ZONE_DETECTION_INTERVAL: 3000 MS
MDC_IDC_SET_ZONE_DETECTION_INTERVAL: 340 MS
MDC_IDC_SET_ZONE_TYPE: NORMAL
MDC_IDC_STAT_AT_BURDEN_PERCENT: 0 %
MDC_IDC_STAT_AT_DTM_END: NORMAL
MDC_IDC_STAT_AT_DTM_START: NORMAL
MDC_IDC_STAT_EPISODE_RECENT_COUNT: 0
MDC_IDC_STAT_EPISODE_RECENT_COUNT: 8
MDC_IDC_STAT_EPISODE_RECENT_COUNT_DTM_END: NORMAL
MDC_IDC_STAT_EPISODE_RECENT_COUNT_DTM_START: NORMAL
MDC_IDC_STAT_EPISODE_TOTAL_COUNT: 0
MDC_IDC_STAT_EPISODE_TOTAL_COUNT: 0
MDC_IDC_STAT_EPISODE_TOTAL_COUNT: 1
MDC_IDC_STAT_EPISODE_TOTAL_COUNT: 2
MDC_IDC_STAT_EPISODE_TOTAL_COUNT: 58
MDC_IDC_STAT_EPISODE_TOTAL_COUNT: 7
MDC_IDC_STAT_EPISODE_TOTAL_COUNT_DTM_END: NORMAL
MDC_IDC_STAT_EPISODE_TOTAL_COUNT_DTM_START: NORMAL
MDC_IDC_STAT_EPISODE_TYPE: NORMAL

## 2020-11-20 ENCOUNTER — DOCUMENTATION ONLY (OUTPATIENT)
Dept: CARDIOLOGY | Facility: CLINIC | Age: 60
End: 2020-11-20

## 2020-11-20 NOTE — PROGRESS NOTES
Received records from [facility], sent to [facility] for scanning.

## 2020-12-22 ENCOUNTER — ANCILLARY PROCEDURE (OUTPATIENT)
Dept: CARDIOLOGY | Facility: CLINIC | Age: 60
End: 2020-12-22
Attending: INTERNAL MEDICINE
Payer: COMMERCIAL

## 2020-12-22 DIAGNOSIS — Z45.09 ENCOUNTER FOR LOOP RECORDER CHECK: ICD-10-CM

## 2020-12-22 DIAGNOSIS — I63.9 CVA (CEREBRAL VASCULAR ACCIDENT) (H): ICD-10-CM

## 2020-12-22 PROCEDURE — 93298 REM INTERROG DEV EVAL SCRMS: CPT | Performed by: INTERNAL MEDICINE

## 2020-12-22 PROCEDURE — G2066 INTER DEVC REMOTE 30D: HCPCS | Performed by: INTERNAL MEDICINE

## 2021-01-02 LAB
MDC_IDC_MSMT_BATTERY_STATUS: NORMAL
MDC_IDC_PG_IMPLANT_DTM: NORMAL
MDC_IDC_PG_MFG: NORMAL
MDC_IDC_PG_MODEL: NORMAL
MDC_IDC_PG_SERIAL: NORMAL
MDC_IDC_PG_TYPE: NORMAL
MDC_IDC_SESS_CLINIC_NAME: NORMAL
MDC_IDC_SESS_DTM: NORMAL
MDC_IDC_SESS_TYPE: NORMAL
MDC_IDC_SET_ZONE_DETECTION_INTERVAL: 2000 MS
MDC_IDC_SET_ZONE_DETECTION_INTERVAL: 3000 MS
MDC_IDC_SET_ZONE_DETECTION_INTERVAL: 340 MS
MDC_IDC_SET_ZONE_TYPE: NORMAL
MDC_IDC_STAT_AT_BURDEN_PERCENT: 0 %
MDC_IDC_STAT_AT_DTM_END: NORMAL
MDC_IDC_STAT_AT_DTM_START: NORMAL
MDC_IDC_STAT_EPISODE_RECENT_COUNT: 0
MDC_IDC_STAT_EPISODE_RECENT_COUNT: 1
MDC_IDC_STAT_EPISODE_RECENT_COUNT: 18
MDC_IDC_STAT_EPISODE_RECENT_COUNT_DTM_END: NORMAL
MDC_IDC_STAT_EPISODE_RECENT_COUNT_DTM_START: NORMAL
MDC_IDC_STAT_EPISODE_TOTAL_COUNT: 0
MDC_IDC_STAT_EPISODE_TOTAL_COUNT: 0
MDC_IDC_STAT_EPISODE_TOTAL_COUNT: 2
MDC_IDC_STAT_EPISODE_TOTAL_COUNT: 2
MDC_IDC_STAT_EPISODE_TOTAL_COUNT: 7
MDC_IDC_STAT_EPISODE_TOTAL_COUNT: 76
MDC_IDC_STAT_EPISODE_TOTAL_COUNT_DTM_END: NORMAL
MDC_IDC_STAT_EPISODE_TOTAL_COUNT_DTM_START: NORMAL
MDC_IDC_STAT_EPISODE_TYPE: NORMAL

## 2021-01-07 ENCOUNTER — HOSPITAL ENCOUNTER (OUTPATIENT)
Dept: CARDIOLOGY | Facility: CLINIC | Age: 61
Discharge: HOME | End: 2021-01-07
Attending: INTERNAL MEDICINE | Admitting: INTERNAL MEDICINE
Payer: COMMERCIAL

## 2021-01-07 DIAGNOSIS — Z86.73 HISTORY OF CVA (CEREBROVASCULAR ACCIDENT): ICD-10-CM

## 2021-01-07 DIAGNOSIS — Q21.12 PFO (PATENT FORAMEN OVALE): ICD-10-CM

## 2021-01-07 DIAGNOSIS — I34.0 MITRAL VALVE INSUFFICIENCY, UNSPECIFIED ETIOLOGY: ICD-10-CM

## 2021-01-07 PROCEDURE — 93306 TTE W/DOPPLER COMPLETE: CPT | Mod: 26 | Performed by: INTERNAL MEDICINE

## 2021-01-07 PROCEDURE — 93306 TTE W/DOPPLER COMPLETE: CPT

## 2021-01-29 ENCOUNTER — VIRTUAL VISIT (OUTPATIENT)
Dept: CARDIOLOGY | Facility: CLINIC | Age: 61
End: 2021-01-29
Attending: INTERNAL MEDICINE
Payer: COMMERCIAL

## 2021-01-29 DIAGNOSIS — Z86.73 HISTORY OF CVA (CEREBROVASCULAR ACCIDENT): ICD-10-CM

## 2021-01-29 DIAGNOSIS — I34.0 MITRAL VALVE INSUFFICIENCY, UNSPECIFIED ETIOLOGY: ICD-10-CM

## 2021-01-29 DIAGNOSIS — Q21.12 PFO (PATENT FORAMEN OVALE): ICD-10-CM

## 2021-01-29 PROCEDURE — 99213 OFFICE O/P EST LOW 20 MIN: CPT | Mod: GT | Performed by: INTERNAL MEDICINE

## 2021-01-29 NOTE — LETTER
1/29/2021    Kwaku Castro MD  [address]  [place] [address]    RE: Dean Duane Heuer       Dear Colleague,    I had the pleasure of seeing Dean Duane Heuer in the [facility].    Tony is a 60 year old who is being evaluated via a billable video visit.      How would you like to obtain your AVS? Mail a copy  If the video visit is dropped, the invitation should be resent by: Text to cell phone: [phone]  Will anyone else be joining your video visit? No  Vitals reported by patient:  Weight: 165 lbs     Review Of Systems  Skin: negative  Eyes: negative  Ears/Nose/Throat: negative  Respiratory: No shortness of breath, dyspnea on exertion, cough, or hemoptysis  Cardiovascular: negative  Gastrointestinal: negative  Genitourinary: negative  Musculoskeletal: back pain  Neurologic: negative  Psychiatric: negative  Hematologic/Lymphatic/Immunologic: negative  Endocrine: negative    Reviewed by: Lyndsay Sotelo MA      Video Start Time: 8:45  Video-Visit Details    Type of service:  Video Visit    Video End Time:8:56     Originating Location (pt. Location): Home    Distant Location (provider location):  [facility]       Tony Squires is a very pleasant 60-year-old we had a visit video visit.  He was at home in no acute distress.  He denied any chest pain chest pressure syncope near syncope PND orthopnea or pedal edema.  Reviewed his echocardiogram being stable with moderate mitral and aortic insufficiency and normal left ventricular function.  We reviewed his device dysfunction and thinking that they are worse prolonged pauses but he was actually fine.  He is having no significant arrhythmias or symptoms.    Physical exam he is in no acute distress.  Head is normocephalic atraumatic.    Eyes without redness or discharge    Respiratory without labored respirations    Extremities without edema    Neurologic without anxiety or  tremulousness.    Assessment: Patient is stable from a cardiovascular standpoint.  Asymptomatic and echocardiogram appears stable as does physical exam.    Platform used for Video Visit: [employer]        Thank you for allowing me to participate in the care of your patient.    Sincerely,     YOKO WARD MD     [facility]

## 2021-01-29 NOTE — PROGRESS NOTES
Tony is a 60 year old who is being evaluated via a billable video visit.      How would you like to obtain your AVS? Mail a copy  If the video visit is dropped, the invitation should be resent by: Text to cell phone: [phone]  Will anyone else be joining your video visit? No  Vitals reported by patient:  Weight: 165 lbs     Review Of Systems  Skin: negative  Eyes: negative  Ears/Nose/Throat: negative  Respiratory: No shortness of breath, dyspnea on exertion, cough, or hemoptysis  Cardiovascular: negative  Gastrointestinal: negative  Genitourinary: negative  Musculoskeletal: back pain  Neurologic: negative  Psychiatric: negative  Hematologic/Lymphatic/Immunologic: negative  Endocrine: negative    Reviewed by: Lyndsay Sotelo MA      Video Start Time: 8:45  Video-Visit Details    Type of service:  Video Visit    Video End Time:8:56     Originating Location (pt. Location): Home    Distant Location (provider location):  [facility] Squires is a very pleasant 60-year-old we had a visit video visit.  He was at home in no acute distress.  He denied any chest pain chest pressure syncope near syncope PND orthopnea or pedal edema.  Reviewed his echocardiogram being stable with moderate mitral and aortic insufficiency and normal left ventricular function.  We reviewed his device dysfunction and thinking that they are worse prolonged pauses but he was actually fine.  He is having no significant arrhythmias or symptoms.    Physical exam he is in no acute distress.  Head is normocephalic atraumatic.    Eyes without redness or discharge    Respiratory without labored respirations    Extremities without edema    Neurologic without anxiety or tremulousness.    Assessment: Patient is stable from a cardiovascular standpoint.  Asymptomatic and echocardiogram appears stable as does physical exam.    Platform used for Video Visit: [employer]

## 2021-03-23 ENCOUNTER — ANCILLARY PROCEDURE (OUTPATIENT)
Dept: CARDIOLOGY | Facility: CLINIC | Age: 61
End: 2021-03-23
Attending: INTERNAL MEDICINE
Payer: COMMERCIAL

## 2021-03-23 DIAGNOSIS — Z45.09 ENCOUNTER FOR LOOP RECORDER CHECK: ICD-10-CM

## 2021-03-23 DIAGNOSIS — I63.9 CVA (CEREBRAL VASCULAR ACCIDENT) (H): ICD-10-CM

## 2021-03-23 PROCEDURE — G2066 INTER DEVC REMOTE 30D: HCPCS | Performed by: INTERNAL MEDICINE

## 2021-03-23 PROCEDURE — 93297 REM INTERROG DEV EVAL ICPMS: CPT | Performed by: INTERNAL MEDICINE

## 2021-03-26 LAB
MDC_IDC_MSMT_BATTERY_STATUS: NORMAL
MDC_IDC_PG_IMPLANT_DTM: NORMAL
MDC_IDC_PG_MFG: NORMAL
MDC_IDC_PG_MODEL: NORMAL
MDC_IDC_PG_SERIAL: NORMAL
MDC_IDC_PG_TYPE: NORMAL
MDC_IDC_SESS_CLINIC_NAME: NORMAL
MDC_IDC_SESS_DTM: NORMAL
MDC_IDC_SESS_TYPE: NORMAL
MDC_IDC_SET_ZONE_DETECTION_INTERVAL: 2000 MS
MDC_IDC_SET_ZONE_DETECTION_INTERVAL: 3000 MS
MDC_IDC_SET_ZONE_DETECTION_INTERVAL: 340 MS
MDC_IDC_SET_ZONE_TYPE: NORMAL
MDC_IDC_STAT_AT_BURDEN_PERCENT: 0 %
MDC_IDC_STAT_AT_DTM_END: NORMAL
MDC_IDC_STAT_AT_DTM_START: NORMAL
MDC_IDC_STAT_EPISODE_RECENT_COUNT: 0
MDC_IDC_STAT_EPISODE_RECENT_COUNT: 7
MDC_IDC_STAT_EPISODE_RECENT_COUNT_DTM_END: NORMAL
MDC_IDC_STAT_EPISODE_RECENT_COUNT_DTM_START: NORMAL
MDC_IDC_STAT_EPISODE_TOTAL_COUNT: 0
MDC_IDC_STAT_EPISODE_TOTAL_COUNT: 0
MDC_IDC_STAT_EPISODE_TOTAL_COUNT: 2
MDC_IDC_STAT_EPISODE_TOTAL_COUNT: 2
MDC_IDC_STAT_EPISODE_TOTAL_COUNT: 7
MDC_IDC_STAT_EPISODE_TOTAL_COUNT: 83
MDC_IDC_STAT_EPISODE_TOTAL_COUNT_DTM_END: NORMAL
MDC_IDC_STAT_EPISODE_TOTAL_COUNT_DTM_START: NORMAL
MDC_IDC_STAT_EPISODE_TYPE: NORMAL

## 2021-06-24 ENCOUNTER — ANCILLARY PROCEDURE (OUTPATIENT)
Dept: CARDIOLOGY | Facility: CLINIC | Age: 61
End: 2021-06-24
Attending: INTERNAL MEDICINE
Payer: COMMERCIAL

## 2021-06-24 DIAGNOSIS — I63.9 CVA (CEREBRAL VASCULAR ACCIDENT) (H): ICD-10-CM

## 2021-06-24 DIAGNOSIS — Z45.09 ENCOUNTER FOR LOOP RECORDER CHECK: ICD-10-CM

## 2021-06-25 PROCEDURE — 93297 REM INTERROG DEV EVAL ICPMS: CPT | Performed by: INTERNAL MEDICINE

## 2021-06-25 PROCEDURE — G2066 INTER DEVC REMOTE 30D: HCPCS | Performed by: INTERNAL MEDICINE

## 2021-07-09 LAB
MDC_IDC_EPISODE_DTM: NORMAL
MDC_IDC_EPISODE_DURATION: 3.82 S
MDC_IDC_EPISODE_ID: 109
MDC_IDC_EPISODE_TYPE: NORMAL
MDC_IDC_MSMT_BATTERY_STATUS: NORMAL
MDC_IDC_PG_IMPLANT_DTM: NORMAL
MDC_IDC_PG_MFG: NORMAL
MDC_IDC_PG_MODEL: NORMAL
MDC_IDC_PG_SERIAL: NORMAL
MDC_IDC_PG_TYPE: NORMAL
MDC_IDC_SESS_CLINIC_NAME: NORMAL
MDC_IDC_SESS_DTM: NORMAL
MDC_IDC_SESS_TYPE: NORMAL
MDC_IDC_SET_ZONE_DETECTION_INTERVAL: 2000 MS
MDC_IDC_SET_ZONE_DETECTION_INTERVAL: 3000 MS
MDC_IDC_SET_ZONE_DETECTION_INTERVAL: 340 MS
MDC_IDC_SET_ZONE_TYPE: NORMAL
MDC_IDC_STAT_AT_BURDEN_PERCENT: 0 %
MDC_IDC_STAT_AT_DTM_END: NORMAL
MDC_IDC_STAT_AT_DTM_START: NORMAL
MDC_IDC_STAT_EPISODE_RECENT_COUNT: 0
MDC_IDC_STAT_EPISODE_RECENT_COUNT: 15
MDC_IDC_STAT_EPISODE_RECENT_COUNT_DTM_END: NORMAL
MDC_IDC_STAT_EPISODE_RECENT_COUNT_DTM_START: NORMAL
MDC_IDC_STAT_EPISODE_TOTAL_COUNT: 0
MDC_IDC_STAT_EPISODE_TOTAL_COUNT: 0
MDC_IDC_STAT_EPISODE_TOTAL_COUNT: 2
MDC_IDC_STAT_EPISODE_TOTAL_COUNT: 2
MDC_IDC_STAT_EPISODE_TOTAL_COUNT: 7
MDC_IDC_STAT_EPISODE_TOTAL_COUNT: 98
MDC_IDC_STAT_EPISODE_TOTAL_COUNT_DTM_END: NORMAL
MDC_IDC_STAT_EPISODE_TOTAL_COUNT_DTM_START: NORMAL
MDC_IDC_STAT_EPISODE_TYPE: NORMAL

## 2021-09-30 ENCOUNTER — ANCILLARY PROCEDURE (OUTPATIENT)
Dept: CARDIOLOGY | Facility: CLINIC | Age: 61
End: 2021-09-30
Attending: INTERNAL MEDICINE
Payer: COMMERCIAL

## 2021-09-30 DIAGNOSIS — I63.9 CVA (CEREBRAL VASCULAR ACCIDENT) (H): ICD-10-CM

## 2021-09-30 DIAGNOSIS — Z45.09 ENCOUNTER FOR LOOP RECORDER CHECK: ICD-10-CM

## 2021-09-30 PROCEDURE — G2066 INTER DEVC REMOTE 30D: HCPCS | Performed by: INTERNAL MEDICINE

## 2021-09-30 PROCEDURE — 93297 REM INTERROG DEV EVAL ICPMS: CPT | Performed by: INTERNAL MEDICINE

## 2021-10-04 LAB
MDC_IDC_EPISODE_DTM: NORMAL
MDC_IDC_EPISODE_DURATION: 3.02 S
MDC_IDC_EPISODE_ID: 117
MDC_IDC_EPISODE_TYPE: NORMAL
MDC_IDC_MSMT_BATTERY_STATUS: NORMAL
MDC_IDC_PG_IMPLANT_DTM: NORMAL
MDC_IDC_PG_MFG: NORMAL
MDC_IDC_PG_MODEL: NORMAL
MDC_IDC_PG_SERIAL: NORMAL
MDC_IDC_PG_TYPE: NORMAL
MDC_IDC_SESS_CLINIC_NAME: NORMAL
MDC_IDC_SESS_DTM: NORMAL
MDC_IDC_SESS_TYPE: NORMAL
MDC_IDC_SET_ZONE_TYPE: NORMAL
MDC_IDC_STAT_AT_BURDEN_PERCENT: 0 %
MDC_IDC_STAT_AT_DTM_END: NORMAL
MDC_IDC_STAT_AT_DTM_START: NORMAL
MDC_IDC_STAT_EPISODE_RECENT_COUNT: 0
MDC_IDC_STAT_EPISODE_RECENT_COUNT: 1
MDC_IDC_STAT_EPISODE_RECENT_COUNT_DTM_END: NORMAL
MDC_IDC_STAT_EPISODE_RECENT_COUNT_DTM_START: NORMAL
MDC_IDC_STAT_EPISODE_TOTAL_COUNT: 0
MDC_IDC_STAT_EPISODE_TOTAL_COUNT: 0
MDC_IDC_STAT_EPISODE_TOTAL_COUNT: 106
MDC_IDC_STAT_EPISODE_TOTAL_COUNT: 2
MDC_IDC_STAT_EPISODE_TOTAL_COUNT: 2
MDC_IDC_STAT_EPISODE_TOTAL_COUNT: 7
MDC_IDC_STAT_EPISODE_TOTAL_COUNT_DTM_END: NORMAL
MDC_IDC_STAT_EPISODE_TOTAL_COUNT_DTM_START: NORMAL
MDC_IDC_STAT_EPISODE_TYPE: NORMAL

## 2022-01-06 ENCOUNTER — ANCILLARY PROCEDURE (OUTPATIENT)
Dept: CARDIOLOGY | Facility: CLINIC | Age: 62
End: 2022-01-06
Attending: INTERNAL MEDICINE
Payer: COMMERCIAL

## 2022-01-06 DIAGNOSIS — I63.9 CVA (CEREBRAL VASCULAR ACCIDENT) (H): ICD-10-CM

## 2022-01-06 DIAGNOSIS — Z45.09 ENCOUNTER FOR LOOP RECORDER CHECK: ICD-10-CM

## 2022-01-06 PROCEDURE — G2066 INTER DEVC REMOTE 30D: HCPCS | Performed by: INTERNAL MEDICINE

## 2022-01-06 PROCEDURE — 93297 REM INTERROG DEV EVAL ICPMS: CPT | Performed by: INTERNAL MEDICINE

## 2022-01-13 ENCOUNTER — TELEPHONE (OUTPATIENT)
Dept: CARDIOLOGY | Facility: CLINIC | Age: 62
End: 2022-01-13
Payer: COMMERCIAL

## 2022-01-13 NOTE — TELEPHONE ENCOUNTER
Remote alert received for ILR triggering JAMA on 1/12/2022. Pt's ILR was implanted in 6/2018 for cryptogenic stroke. No AF episodes were found on device (there were 7 episodes triggered as AF, but all 7 EGMs showed SR with clear P waves, ectopy, and noise).     Called pt. Explained that his ILR has triggered JAMA and we will not be monitoring it any more. Asked pt if he would like to leave it in place or have it removed. He said he'd like to leave it in, and he will call us if he changes his mind in the future.     Cancelled the next remote check.

## 2022-01-20 LAB
MDC_IDC_MSMT_BATTERY_STATUS: NORMAL
MDC_IDC_PG_IMPLANT_DTM: NORMAL
MDC_IDC_PG_MFG: NORMAL
MDC_IDC_PG_MODEL: NORMAL
MDC_IDC_PG_SERIAL: NORMAL
MDC_IDC_PG_TYPE: NORMAL
MDC_IDC_SESS_CLINIC_NAME: NORMAL
MDC_IDC_SESS_DTM: NORMAL
MDC_IDC_SESS_TYPE: NORMAL
MDC_IDC_SET_ZONE_DETECTION_INTERVAL: 2000 MS
MDC_IDC_SET_ZONE_DETECTION_INTERVAL: 3000 MS
MDC_IDC_SET_ZONE_DETECTION_INTERVAL: 340 MS
MDC_IDC_SET_ZONE_TYPE: NORMAL
MDC_IDC_STAT_AT_BURDEN_PERCENT: 0 %
MDC_IDC_STAT_AT_DTM_END: NORMAL
MDC_IDC_STAT_AT_DTM_START: NORMAL
MDC_IDC_STAT_EPISODE_RECENT_COUNT: 0
MDC_IDC_STAT_EPISODE_RECENT_COUNT: 16
MDC_IDC_STAT_EPISODE_RECENT_COUNT_DTM_END: NORMAL
MDC_IDC_STAT_EPISODE_RECENT_COUNT_DTM_START: NORMAL
MDC_IDC_STAT_EPISODE_TOTAL_COUNT: 0
MDC_IDC_STAT_EPISODE_TOTAL_COUNT: 0
MDC_IDC_STAT_EPISODE_TOTAL_COUNT: 114
MDC_IDC_STAT_EPISODE_TOTAL_COUNT: 2
MDC_IDC_STAT_EPISODE_TOTAL_COUNT: 2
MDC_IDC_STAT_EPISODE_TOTAL_COUNT: 7
MDC_IDC_STAT_EPISODE_TOTAL_COUNT_DTM_END: NORMAL
MDC_IDC_STAT_EPISODE_TOTAL_COUNT_DTM_START: NORMAL
MDC_IDC_STAT_EPISODE_TYPE: NORMAL

## (undated) RX ORDER — FENTANYL CITRATE 50 UG/ML
INJECTION, SOLUTION INTRAMUSCULAR; INTRAVENOUS
Status: DISPENSED
Start: 2018-06-26

## (undated) RX ORDER — CEFAZOLIN SODIUM 2 G/100ML
INJECTION, SOLUTION INTRAVENOUS
Status: DISPENSED
Start: 2018-06-26

## (undated) RX ORDER — FENTANYL CITRATE 50 UG/ML
INJECTION, SOLUTION INTRAMUSCULAR; INTRAVENOUS
Status: DISPENSED
Start: 2018-05-09

## (undated) RX ORDER — FLUMAZENIL 0.1 MG/ML
INJECTION, SOLUTION INTRAVENOUS
Status: DISPENSED
Start: 2018-05-09

## (undated) RX ORDER — LIDOCAINE HYDROCHLORIDE 10 MG/ML
INJECTION, SOLUTION EPIDURAL; INFILTRATION; INTRACAUDAL; PERINEURAL
Status: DISPENSED
Start: 2018-06-26

## (undated) RX ORDER — NALOXONE HYDROCHLORIDE 0.4 MG/ML
INJECTION, SOLUTION INTRAMUSCULAR; INTRAVENOUS; SUBCUTANEOUS
Status: DISPENSED
Start: 2018-05-09

## (undated) RX ORDER — GLYCOPYRROLATE 0.2 MG/ML
INJECTION, SOLUTION INTRAMUSCULAR; INTRAVENOUS
Status: DISPENSED
Start: 2018-05-09